# Patient Record
Sex: FEMALE | Race: WHITE | Employment: FULL TIME | URBAN - METROPOLITAN AREA
[De-identification: names, ages, dates, MRNs, and addresses within clinical notes are randomized per-mention and may not be internally consistent; named-entity substitution may affect disease eponyms.]

---

## 2016-10-25 LAB
C TRACH DNA SPEC QL PROBE+SIG AMP: NORMAL
HBV SURFACE AG SERPL QL IA: NORMAL
HIV 1+2 AB+HIV1 P24 AG SERPL QL IA: NORMAL
RUBELLA ABY IGG: NORMAL

## 2017-03-10 LAB — T PALLIDUM IGG SER QL: NONREACTIVE

## 2017-04-12 ENCOUNTER — HOSPITAL ENCOUNTER (OUTPATIENT)
Facility: CLINIC | Age: 32
End: 2017-04-12
Payer: COMMERCIAL

## 2017-05-11 LAB — GROUP B STREP PCR: POSITIVE

## 2017-05-12 ENCOUNTER — HOSPITAL ENCOUNTER (OUTPATIENT)
Facility: CLINIC | Age: 32
Discharge: HOME | End: 2017-05-12
Attending: OBSTETRICS & GYNECOLOGY | Admitting: OBSTETRICS & GYNECOLOGY
Payer: COMMERCIAL

## 2017-05-12 VITALS
WEIGHT: 267 LBS | BODY MASS INDEX: 44.48 KG/M2 | TEMPERATURE: 98.4 F | SYSTOLIC BLOOD PRESSURE: 143 MMHG | RESPIRATION RATE: 18 BRPM | DIASTOLIC BLOOD PRESSURE: 82 MMHG | HEIGHT: 65 IN

## 2017-05-12 LAB
ALT SERPL W P-5'-P-CCNC: 14 U/L (ref 0–50)
AST SERPL W P-5'-P-CCNC: 15 U/L (ref 0–45)
CREAT SERPL-MCNC: 0.46 MG/DL (ref 0.52–1.04)
CREAT UR-MCNC: 26 MG/DL
ERYTHROCYTE [DISTWIDTH] IN BLOOD BY AUTOMATED COUNT: 13.3 % (ref 10–15)
GFR SERPL CREATININE-BSD FRML MDRD: ABNORMAL ML/MIN/1.7M2
HCT VFR BLD AUTO: 32.9 % (ref 35–47)
HGB BLD-MCNC: 11.1 G/DL (ref 11.7–15.7)
MCH RBC QN AUTO: 33.2 PG (ref 26.5–33)
MCHC RBC AUTO-ENTMCNC: 33.7 G/DL (ref 31.5–36.5)
MCV RBC AUTO: 99 FL (ref 78–100)
PLATELET # BLD AUTO: 207 10E9/L (ref 150–450)
PROT UR-MCNC: 0.05 G/L
PROT/CREAT 24H UR: 0.19 G/G CR (ref 0–0.2)
RBC # BLD AUTO: 3.34 10E12/L (ref 3.8–5.2)
URATE SERPL-MCNC: 3.7 MG/DL (ref 2.6–6)
WBC # BLD AUTO: 5.3 10E9/L (ref 4–11)

## 2017-05-12 PROCEDURE — 36415 COLL VENOUS BLD VENIPUNCTURE: CPT | Performed by: OBSTETRICS & GYNECOLOGY

## 2017-05-12 PROCEDURE — 82565 ASSAY OF CREATININE: CPT | Performed by: OBSTETRICS & GYNECOLOGY

## 2017-05-12 PROCEDURE — 84156 ASSAY OF PROTEIN URINE: CPT | Performed by: OBSTETRICS & GYNECOLOGY

## 2017-05-12 PROCEDURE — 84460 ALANINE AMINO (ALT) (SGPT): CPT | Performed by: OBSTETRICS & GYNECOLOGY

## 2017-05-12 PROCEDURE — 84550 ASSAY OF BLOOD/URIC ACID: CPT | Performed by: OBSTETRICS & GYNECOLOGY

## 2017-05-12 PROCEDURE — 99214 OFFICE O/P EST MOD 30 MIN: CPT | Mod: 25

## 2017-05-12 PROCEDURE — 84450 TRANSFERASE (AST) (SGOT): CPT | Performed by: OBSTETRICS & GYNECOLOGY

## 2017-05-12 PROCEDURE — 59025 FETAL NON-STRESS TEST: CPT | Mod: 59

## 2017-05-12 PROCEDURE — 85027 COMPLETE CBC AUTOMATED: CPT | Performed by: OBSTETRICS & GYNECOLOGY

## 2017-05-12 PROCEDURE — 59025 FETAL NON-STRESS TEST: CPT

## 2017-05-12 RX ORDER — ONDANSETRON 2 MG/ML
4 INJECTION INTRAMUSCULAR; INTRAVENOUS EVERY 6 HOURS PRN
Status: DISCONTINUED | OUTPATIENT
Start: 2017-05-12 | End: 2017-05-12 | Stop reason: HOSPADM

## 2017-05-12 NOTE — IP AVS SNAPSHOT
MRN:[id number]                      After Visit Summary   5/12/2017    Linda Ricketts    MRN: [id number]           Thank you!     Thank you for choosing [facility] for your care. Our goal is always to provide you with excellent care. Hearing back from our patients is one way we can continue to improve our services. Please take a few minutes to complete the written survey that you may receive in the mail after you visit. If you would like to speak to someone directly about your visit please contact Patient Relations at [phone]. Thank you!          Patient Information     Date Of Birth          1985        About your hospital stay     You were admitted on:  May 12, 2017 You last received care in the:  [facility] Labor and Delivery    You were discharged on:  May 12, 2017       Who to Call     For medical emergencies, please call 911.  For non-urgent questions about your medical care, please call your primary care provider or clinic, [phone]          Attending Provider     Provider Specialty    Gia Hughes MD OB/Gyn       Primary Care Provider Office Phone # Fax #    Lakia Fischer, [facility] [phone]       [facility] [address] [address]        Further instructions from your care team       Discharge Instruction for Undelivered Patients      You were seen for: Pregnancy induced hypertention.  We Consulted: Dr. Hughes  You had (Test or Medicine):NST, UA and PIH labs.     Diet:   Drink 8 to 12 glasses of liquids (milk, juice, water) every day.  You may eat meals and snacks.     Activity:  Call your doctor or nurse midwife if your baby is moving less than usual.   May continue normal activities, no field trips.    Call your provider if you notice:  Swelling in your face or increased swelling in your hands or legs.  Headaches that are not relieved by Tylenol (acetaminophen).  Changes in your vision (blurring: seeing spots or  "stars.)  Nausea (sick to your stomach) and vomiting (throwing up).   Weight gain of 5 pounds or more per week.  Heartburn that doesn't go away.  Signs of bladder infection: pain when you urinate (use the toilet), need to go more often and more urgently.  The bag of stephens (rupture of membranes) breaks, or you notice leaking in your underwear.  Bright red blood in your underwear.  Abdominal (lower belly) or stomach pain.  For first baby: Contractions (tightening) less than 5 minutes apart for one hour or more.  Second (plus) baby: Contractions (tightening) less than 10 minutes apart and getting stronger.  *If less than 34 weeks: Contractions (tightenings) more than 6 times in one hour.  Increase or change in vaginal discharge (note the color and amount)  Other: Call with any questions or concerns.    Follow-up:  As scheduled in the clinic (appointment scheduled for Monday).          Pending Results     No orders found from 5/10/2017 to 2017.            Admission Information     Date & Time Provider Department Dept. Phone    2017 Gia Hughes MD [facility] Labor and Delivery [phone]      Your Vitals Were     Blood Pressure Temperature Height Weight BMI (Body Mass Index)       143/82 98.4  F (36.9  C) (Oral) 1.651 m (5' 5\") 121.1 kg (267 lb) 44.43 kg/m2       MyChart Information     [employer] lets you send messages to your doctor, view your test results, renew your prescriptions, schedule appointments and more. To sign up, go to [email or web address] . Click on \"Log in\" on the left side of the screen, which will take you to the Welcome page. Then click on \"Sign up Now\" on the right side of the page.     You will be asked to enter the access code listed below, as well as some personal information. Please follow the directions to create your username and password.     Your access code is: [email or web address]  Expires: 8/10/2017 12:54 PM     Your access code will  in 90 days. If you need help or a " new code, please call your [place] clinic or [phone].        Care EveryWhere ID     This is your Care EveryWhere ID. This could be used by other organizations to access your [place] medical records  [id number]           Review of your medicines      UNREVIEWED medicines. Ask your doctor about these medicines        Dose / Directions    PRENATAL VITAMIN PO        Refills:  0       TYLENOL PO        Refills:  0       ZYRTEC ALLERGY PO        Refills:  0                Protect others around you: Learn how to safely use, store and throw away your medicines at www.disposemymeds.org.             Medication List: This is a list of all your medications and when to take them. Check marks below indicate your daily home schedule. Keep this list as a reference.      Medications           Morning Afternoon Evening Bedtime As Needed    PRENATAL VITAMIN PO                                TYLENOL PO                                ZYRTEC ALLERGY PO

## 2017-05-12 NOTE — PLAN OF CARE
, 36 4/7 weeks gestation. Seen at office today. Sent to labor and delivery for PIH evaluation. Monitors explained and applied. History and prenatal reviewed. +2 reflexes, no clonus. +3 edema of feet noted, denies headache or epigastric pain. UA to lab and blood specimen drawn.

## 2017-05-12 NOTE — PROVIDER NOTIFICATION
05/12/17 1247   Provider Notification   Provider Name/Title Dr. Hughes   Method of Notification Phone   Request Evaluate - Remote   Notification Reason Patient Arrived;Lab/Diagnostic Study   Reactive fetal tracing, labs, UA and serial BP readings reported. Order received for discharge. Patient has appointment on Monday at doctor's office. Patient to continue normal activities, no field trips.

## 2017-05-12 NOTE — DISCHARGE INSTRUCTIONS
Discharge Instruction for Undelivered Patients      You were seen for: Pregnancy induced hypertention.  We Consulted: Dr. Hughes  You had (Test or Medicine):NST, UA and PIH labs.     Diet:   Drink 8 to 12 glasses of liquids (milk, juice, water) every day.  You may eat meals and snacks.     Activity:  Call your doctor or nurse midwife if your baby is moving less than usual.   May continue normal activities, no field trips.    Call your provider if you notice:  Swelling in your face or increased swelling in your hands or legs.  Headaches that are not relieved by Tylenol (acetaminophen).  Changes in your vision (blurring: seeing spots or stars.)  Nausea (sick to your stomach) and vomiting (throwing up).   Weight gain of 5 pounds or more per week.  Heartburn that doesn't go away.  Signs of bladder infection: pain when you urinate (use the toilet), need to go more often and more urgently.  The bag of stephens (rupture of membranes) breaks, or you notice leaking in your underwear.  Bright red blood in your underwear.  Abdominal (lower belly) or stomach pain.  For first baby: Contractions (tightening) less than 5 minutes apart for one hour or more.  Second (plus) baby: Contractions (tightening) less than 10 minutes apart and getting stronger.  *If less than 34 weeks: Contractions (tightenings) more than 6 times in one hour.  Increase or change in vaginal discharge (note the color and amount)  Other: Call with any questions or concerns.    Follow-up:  As scheduled in the clinic (appointment scheduled for Monday).

## 2017-05-12 NOTE — IP AVS SNAPSHOT
[facility] Labor and Delivery    [address]    [facility] [address]    Phone:  [phone]    Fax:  [phone]                                       After Visit Summary   5/12/2017    Linda Ricketts    MRN: [id number]           After Visit Summary Signature Page     I have received my discharge instructions, and my questions have been answered. I have discussed any challenges I see with this plan with the nurse or doctor.    ..........................................................................................................................................  Patient/Patient Representative Signature      ..........................................................................................................................................  Patient Representative Print Name and Relationship to Patient    ..................................................               ................................................  Date                                            Time    ..........................................................................................................................................  Reviewed by Signature/Title    ...................................................              ..............................................  Date                                                            Time

## 2017-05-19 ENCOUNTER — ANESTHESIA EVENT (OUTPATIENT)
Dept: OBGYN | Facility: CLINIC | Age: 32
End: 2017-05-19
Payer: COMMERCIAL

## 2017-05-19 ENCOUNTER — ANESTHESIA (OUTPATIENT)
Dept: OBGYN | Facility: CLINIC | Age: 32
End: 2017-05-19
Payer: COMMERCIAL

## 2017-05-19 ENCOUNTER — HOSPITAL ENCOUNTER (INPATIENT)
Facility: CLINIC | Age: 32
LOS: 5 days | Discharge: HOME HEALTH | End: 2017-05-24
Attending: REGISTERED NURSE | Admitting: REGISTERED NURSE
Payer: COMMERCIAL

## 2017-05-19 DIAGNOSIS — Z98.891 S/P PRIMARY LOW TRANSVERSE C-SECTION: Primary | ICD-10-CM

## 2017-05-19 LAB
ABO + RH BLD: ABNORMAL
ABO + RH BLD: ABNORMAL
ALT SERPL W P-5'-P-CCNC: 11 U/L (ref 0–50)
ANION GAP SERPL CALCULATED.3IONS-SCNC: 10 MMOL/L (ref 3–14)
AST SERPL W P-5'-P-CCNC: 11 U/L (ref 0–45)
BLD GP AB INVEST PLASRBC-IMP: ABNORMAL
BLD GP AB SCN SERPL QL: ABNORMAL
BLOOD BANK CMNT PATIENT-IMP: ABNORMAL
BUN SERPL-MCNC: 7 MG/DL (ref 7–30)
CALCIUM SERPL-MCNC: 9.1 MG/DL (ref 8.5–10.1)
CHLORIDE SERPL-SCNC: 109 MMOL/L (ref 94–109)
CO2 SERPL-SCNC: 20 MMOL/L (ref 20–32)
CREAT SERPL-MCNC: 0.44 MG/DL (ref 0.52–1.04)
CREAT UR-MCNC: 125 MG/DL
ERYTHROCYTE [DISTWIDTH] IN BLOOD BY AUTOMATED COUNT: 13.5 % (ref 10–15)
GFR SERPL CREATININE-BSD FRML MDRD: ABNORMAL ML/MIN/1.7M2
GLUCOSE SERPL-MCNC: 90 MG/DL (ref 70–99)
HCT VFR BLD AUTO: 33.3 % (ref 35–47)
HGB BLD-MCNC: 11.5 G/DL (ref 11.7–15.7)
MCH RBC QN AUTO: 33.2 PG (ref 26.5–33)
MCHC RBC AUTO-ENTMCNC: 34.5 G/DL (ref 31.5–36.5)
MCV RBC AUTO: 96 FL (ref 78–100)
PLATELET # BLD AUTO: 232 10E9/L (ref 150–450)
POTASSIUM SERPL-SCNC: 4.1 MMOL/L (ref 3.4–5.3)
PROT UR-MCNC: 0.21 G/L
PROT/CREAT 24H UR: 0.17 G/G CR (ref 0–0.2)
RBC # BLD AUTO: 3.46 10E12/L (ref 3.8–5.2)
SODIUM SERPL-SCNC: 139 MMOL/L (ref 133–144)
SPECIMEN EXP DATE BLD: ABNORMAL
URATE SERPL-MCNC: 3.5 MG/DL (ref 2.6–6)
WBC # BLD AUTO: 7.2 10E9/L (ref 4–11)

## 2017-05-19 PROCEDURE — 36415 COLL VENOUS BLD VENIPUNCTURE: CPT | Performed by: REGISTERED NURSE

## 2017-05-19 PROCEDURE — 86900 BLOOD TYPING SEROLOGIC ABO: CPT | Performed by: REGISTERED NURSE

## 2017-05-19 PROCEDURE — 84156 ASSAY OF PROTEIN URINE: CPT | Performed by: REGISTERED NURSE

## 2017-05-19 PROCEDURE — 37000011 ZZH ANESTHESIA WARD SERVICE

## 2017-05-19 PROCEDURE — 84550 ASSAY OF BLOOD/URIC ACID: CPT | Performed by: REGISTERED NURSE

## 2017-05-19 PROCEDURE — 84460 ALANINE AMINO (ALT) (SGPT): CPT | Performed by: REGISTERED NURSE

## 2017-05-19 PROCEDURE — 86901 BLOOD TYPING SEROLOGIC RH(D): CPT | Performed by: REGISTERED NURSE

## 2017-05-19 PROCEDURE — 40000671 ZZH STATISTIC ANESTHESIA CASE

## 2017-05-19 PROCEDURE — 25000125 ZZHC RX 250: Performed by: REGISTERED NURSE

## 2017-05-19 PROCEDURE — S0077 INJECTION, CLINDAMYCIN PHOSP: HCPCS | Performed by: REGISTERED NURSE

## 2017-05-19 PROCEDURE — 85027 COMPLETE CBC AUTOMATED: CPT | Performed by: REGISTERED NURSE

## 2017-05-19 PROCEDURE — 37000011 ZZH ANESTHESIA WARD SERVICE: Performed by: NURSE ANESTHETIST, CERTIFIED REGISTERED

## 2017-05-19 PROCEDURE — 86870 RBC ANTIBODY IDENTIFICATION: CPT | Performed by: REGISTERED NURSE

## 2017-05-19 PROCEDURE — 86780 TREPONEMA PALLIDUM: CPT | Performed by: REGISTERED NURSE

## 2017-05-19 PROCEDURE — 84450 TRANSFERASE (AST) (SGOT): CPT | Performed by: REGISTERED NURSE

## 2017-05-19 PROCEDURE — 80048 BASIC METABOLIC PNL TOTAL CA: CPT | Performed by: REGISTERED NURSE

## 2017-05-19 PROCEDURE — 86850 RBC ANTIBODY SCREEN: CPT | Performed by: REGISTERED NURSE

## 2017-05-19 PROCEDURE — 12000029 ZZH R&B OB INTERMEDIATE

## 2017-05-19 PROCEDURE — 25000132 ZZH RX MED GY IP 250 OP 250 PS 637: Performed by: REGISTERED NURSE

## 2017-05-19 RX ORDER — CLINDAMYCIN PHOSPHATE 900 MG/50ML
900 INJECTION, SOLUTION INTRAVENOUS EVERY 8 HOURS
Status: DISCONTINUED | OUTPATIENT
Start: 2017-05-19 | End: 2017-05-21

## 2017-05-19 RX ORDER — OXYCODONE AND ACETAMINOPHEN 5; 325 MG/1; MG/1
1 TABLET ORAL
Status: DISCONTINUED | OUTPATIENT
Start: 2017-05-19 | End: 2017-05-21

## 2017-05-19 RX ORDER — OXYTOCIN 10 [USP'U]/ML
10 INJECTION, SOLUTION INTRAMUSCULAR; INTRAVENOUS
Status: DISCONTINUED | OUTPATIENT
Start: 2017-05-19 | End: 2017-05-21

## 2017-05-19 RX ORDER — METHYLERGONOVINE MALEATE 0.2 MG/ML
200 INJECTION INTRAVENOUS
Status: DISCONTINUED | OUTPATIENT
Start: 2017-05-19 | End: 2017-05-21

## 2017-05-19 RX ORDER — NALOXONE HYDROCHLORIDE 0.4 MG/ML
.1-.4 INJECTION, SOLUTION INTRAMUSCULAR; INTRAVENOUS; SUBCUTANEOUS
Status: DISCONTINUED | OUTPATIENT
Start: 2017-05-19 | End: 2017-05-21

## 2017-05-19 RX ORDER — OXYTOCIN/0.9 % SODIUM CHLORIDE 30/500 ML
100-340 PLASTIC BAG, INJECTION (ML) INTRAVENOUS CONTINUOUS PRN
Status: COMPLETED | OUTPATIENT
Start: 2017-05-19 | End: 2017-05-21

## 2017-05-19 RX ORDER — IBUPROFEN 800 MG/1
800 TABLET, FILM COATED ORAL
Status: DISCONTINUED | OUTPATIENT
Start: 2017-05-19 | End: 2017-05-21

## 2017-05-19 RX ORDER — ONDANSETRON 2 MG/ML
4 INJECTION INTRAMUSCULAR; INTRAVENOUS EVERY 6 HOURS PRN
Status: DISCONTINUED | OUTPATIENT
Start: 2017-05-19 | End: 2017-05-21

## 2017-05-19 RX ORDER — ACETAMINOPHEN 325 MG/1
650 TABLET ORAL EVERY 4 HOURS PRN
Status: DISCONTINUED | OUTPATIENT
Start: 2017-05-19 | End: 2017-05-21

## 2017-05-19 RX ORDER — HYDROXYZINE HYDROCHLORIDE 50 MG/1
100 TABLET, FILM COATED ORAL EVERY 6 HOURS PRN
Status: DISCONTINUED | OUTPATIENT
Start: 2017-05-19 | End: 2017-05-24 | Stop reason: HOSPADM

## 2017-05-19 RX ORDER — LIDOCAINE 40 MG/G
CREAM TOPICAL
Status: DISCONTINUED | OUTPATIENT
Start: 2017-05-19 | End: 2017-05-21

## 2017-05-19 RX ORDER — SODIUM CHLORIDE, SODIUM LACTATE, POTASSIUM CHLORIDE, CALCIUM CHLORIDE 600; 310; 30; 20 MG/100ML; MG/100ML; MG/100ML; MG/100ML
INJECTION, SOLUTION INTRAVENOUS CONTINUOUS
Status: DISCONTINUED | OUTPATIENT
Start: 2017-05-19 | End: 2017-05-21

## 2017-05-19 RX ORDER — CARBOPROST TROMETHAMINE 250 UG/ML
250 INJECTION, SOLUTION INTRAMUSCULAR
Status: DISCONTINUED | OUTPATIENT
Start: 2017-05-19 | End: 2017-05-21

## 2017-05-19 RX ADMIN — DINOPROSTONE 10 MG: 10 INSERT VAGINAL at 21:34

## 2017-05-19 RX ADMIN — CLINDAMYCIN PHOSPHATE 900 MG: 18 INJECTION, SOLUTION INTRAVENOUS at 22:40

## 2017-05-19 NOTE — IP AVS SNAPSHOT
MRN:[id number]                      After Visit Summary   2017    Linda Ricketts    MRN: [id number]           Thank you!     Thank you for choosing [facility] for your care. Our goal is always to provide you with excellent care. Hearing back from our patients is one way we can continue to improve our services. Please take a few minutes to complete the written survey that you may receive in the mail after you visit with us. Thank you!        Patient Information     Date Of Birth          1985        About your hospital stay     You were admitted on:  May 19, 2017 You last received care in the:  [address]    You were discharged on:  May 24, 2017       Who to Call     For medical emergencies, please call 911.  For non-urgent questions about your medical care, please call your primary care provider or clinic, [phone]  For questions related to your surgery, please call your surgery clinic        Attending Provider     Provider Specialty    Temi Yu APRN CNM [facility]    Johnna Hansen MD OB/Gyn       Primary Care Provider Office Phone # Fax #    Lakia Belkis Fischer [phone]       [facility] [address] [address]        After Care Instructions     Activity       Review discharge instructions            Diet       Resume previous diet            Discharge Instructions - Postpartum visit       Schedule postpartum visit with your provider and return to clinic in 6 weeks.                  Further instructions from your care team       Postop  Birth Instructions    Activity       Do not lift more than 10 pounds for 6 weeks after surgery.  Ask family and friends for help when you need it.    No driving until you have stopped taking your pain medications (usually two weeks after surgery).    No heavy exercise or activity for 6 weeks.  Don't do anything that will put a strain on your surgery site.    Don't  strain when using the toilet.  Your care team may prescribe a stool softener if you have problems with your bowel movements.     To care for your incision:       Keep the incision clean and dry.    Do not soak your incision in water. No swimming or hot tubs until it has fully healed. You may soak in the bathtub if the water level is below your incision.    Do not use peroxide, gel, cream, lotion, or ointment on your incision.    Adjust your clothes to avoid pressure on your surgery site (check the elastic in your underwear for example).     You may see a small amount of clear or pink drainage and this is normal.  Check with your health care provider:       If the drainage increases or has an odor.    If the incision reddens, you have swelling, or develop a rash.    If you have increased pain and the medicine we prescribed doesn't help.    If you have a fever above 100.4 F (38 C) with or without chills when placing thermometer under your tongue.   The area around your incision (surgery wound), will feel numb.  This is normal. The numbness should go away in less than a year.     Keep your hands clean:  Always wash your hands before touching your incision (surgery wound). This helps reduce your risk of infection. If your hands aren't dirty, you may use an alcohol hand-rub to clean your hands. Keep your nails clean and short.    Call your healthcare provider if you have any of these symptoms:       You soak a sanitary pad with blood within 1 hour, or you see blood clots larger than a golf ball.    Bleeding that lasts more than 6 weeks.    Vaginal discharge that smells bad.    Severe pain, cramping or tenderness in your lower belly area.    A need to urinate more frequently (use the toilet more often), more urgently (use the toilet very quickly), or it burns when you urinate.    Nausea and vomiting.    Redness, swelling or pain around a vein in your leg.    Problems breastfeeding or a red or painful area on your  "breast.    Chest pain and cough or are gasping for air.    Problems with coping with sadness, anxiety or depression. If you have concerns about hurting yourself or the baby, call your provider immediately.      You have questions or concerns after you return home.                  Pending Results     No orders found from 2017 to 2017.            Statement of Approval     Ordered          17 0823  I have reviewed and agree with all the recommendations and orders detailed in this document.  EFFECTIVE NOW     Approved and electronically signed by:  Temi Yu APRN CNM             Admission Information     Date & Time Provider Department Dept. Phone    2017 Johnna Hansen MD [address] [phone]      Your Vitals Were     Blood Pressure Pulse Temperature Respirations Height Weight    125/76 68 97.9  F (36.6  C) (Oral) 14 1.651 m (5' 5\") 122 kg (269 lb)    Pulse Oximetry BMI (Body Mass Index)                99% 44.76 kg/m2          [facility] Information     [employer] lets you send messages to your doctor, view your test results, renew your prescriptions, schedule appointments and more. To sign up, go to [email or web address] . Click on \"Log in\" on the left side of the screen, which will take you to the Welcome page. Then click on \"Sign up Now\" on the right side of the page.     You will be asked to enter the access code listed below, as well as some personal information. Please follow the directions to create your username and password.     Your access code is: [email or web address]  Expires: 8/10/2017 12:54 PM     Your access code will  in 90 days. If you need help or a new code, please call your [place] clinic or [phone].        Care EveryWhere ID     This is your Care EveryWhere ID. This could be used by other organizations to access your [place] medical records  [id number]           Review of your medicines      UNREVIEWED medicines. Ask your " doctor about these medicines        Dose / Directions    TYLENOL PO        Refills:  0         START taking        Dose / Directions    ibuprofen 200 MG tablet   Commonly known as:  ADVIL/MOTRIN        Dose:  400-800 mg   Take 2-4 tablets (400-800 mg) by mouth every 6 hours as needed for other (cramping)   Refills:  0       oxyCODONE 5 MG IR tablet   Commonly known as:  ROXICODONE        Dose:  5-10 mg   Take 1-2 tablets (5-10 mg) by mouth every 3 hours as needed for moderate to severe pain   Quantity:  30 tablet   Refills:  0         CONTINUE these medicines which have NOT CHANGED        Dose / Directions    PRENATAL VITAMIN PO        Refills:  0         STOP taking     ZYRTEC ALLERGY PO                Where to get your medicines      Some of these will need a paper prescription and others can be bought over the counter. Ask your nurse if you have questions.     Bring a paper prescription for each of these medications     oxyCODONE 5 MG IR tablet       You don't need a prescription for these medications     ibuprofen 200 MG tablet                Protect others around you: Learn how to safely use, store and throw away your medicines at www.disposemymeds.org.             Medication List: This is a list of all your medications and when to take them. Check marks below indicate your daily home schedule. Keep this list as a reference.      Medications           Morning Afternoon Evening Bedtime As Needed    ibuprofen 200 MG tablet   Commonly known as:  ADVIL/MOTRIN   Take 2-4 tablets (400-800 mg) by mouth every 6 hours as needed for other (cramping)   Last time this was given:  800 mg on 5/24/2017  5:55 AM                                oxyCODONE 5 MG IR tablet   Commonly known as:  ROXICODONE   Take 1-2 tablets (5-10 mg) by mouth every 3 hours as needed for moderate to severe pain   Last time this was given:  5 mg on 5/24/2017  9:20 AM                                PRENATAL VITAMIN PO                                 TYLENOL PO   Last time this was given:  975 mg on 5/24/2017 12:03 AM

## 2017-05-19 NOTE — IP AVS SNAPSHOT
[address]    [place] [address]    Phone:  [phone]                                       After Visit Summary   5/19/2017    Linda Ricketts    MRN: [id number]           After Visit Summary Signature Page     I have received my discharge instructions, and my questions have been answered. I have discussed any challenges I see with this plan with the nurse or doctor.    ..........................................................................................................................................  Patient/Patient Representative Signature      ..........................................................................................................................................  Patient Representative Print Name and Relationship to Patient    ..................................................               ................................................  Date                                            Time    ..........................................................................................................................................  Reviewed by Signature/Title    ...................................................              ..............................................  Date                                                            Time

## 2017-05-20 ENCOUNTER — ANESTHESIA EVENT (OUTPATIENT)
Dept: OBGYN | Facility: CLINIC | Age: 32
End: 2017-05-20
Payer: COMMERCIAL

## 2017-05-20 ENCOUNTER — ANESTHESIA (OUTPATIENT)
Dept: OBGYN | Facility: CLINIC | Age: 32
End: 2017-05-20
Payer: COMMERCIAL

## 2017-05-20 LAB — T PALLIDUM IGG+IGM SER QL: NEGATIVE

## 2017-05-20 PROCEDURE — S0077 INJECTION, CLINDAMYCIN PHOSP: HCPCS | Performed by: REGISTERED NURSE

## 2017-05-20 PROCEDURE — 25000128 H RX IP 250 OP 636: Performed by: ANESTHESIOLOGY

## 2017-05-20 PROCEDURE — 37000011 ZZH ANESTHESIA WARD SERVICE

## 2017-05-20 PROCEDURE — 25000128 H RX IP 250 OP 636: Performed by: REGISTERED NURSE

## 2017-05-20 PROCEDURE — 25000125 ZZHC RX 250: Performed by: REGISTERED NURSE

## 2017-05-20 PROCEDURE — 3E0P7GC INTRODUCTION OF OTHER THERAPEUTIC SUBSTANCE INTO FEMALE REPRODUCTIVE, VIA NATURAL OR ARTIFICIAL OPENING: ICD-10-PCS | Performed by: REGISTERED NURSE

## 2017-05-20 PROCEDURE — 25000132 ZZH RX MED GY IP 250 OP 250 PS 637: Performed by: MIDWIFE

## 2017-05-20 PROCEDURE — 36415 COLL VENOUS BLD VENIPUNCTURE: CPT | Performed by: REGISTERED NURSE

## 2017-05-20 PROCEDURE — 25000132 ZZH RX MED GY IP 250 OP 250 PS 637: Performed by: REGISTERED NURSE

## 2017-05-20 PROCEDURE — S0191 MISOPROSTOL, ORAL, 200 MCG: HCPCS | Performed by: MIDWIFE

## 2017-05-20 PROCEDURE — 25000125 ZZHC RX 250: Performed by: ANESTHESIOLOGY

## 2017-05-20 PROCEDURE — 12000029 ZZH R&B OB INTERMEDIATE

## 2017-05-20 PROCEDURE — S0191 MISOPROSTOL, ORAL, 200 MCG: HCPCS | Performed by: REGISTERED NURSE

## 2017-05-20 PROCEDURE — 85049 AUTOMATED PLATELET COUNT: CPT | Performed by: REGISTERED NURSE

## 2017-05-20 RX ORDER — LIDOCAINE HYDROCHLORIDE AND EPINEPHRINE 15; 5 MG/ML; UG/ML
3 INJECTION, SOLUTION EPIDURAL
Status: DISCONTINUED | OUTPATIENT
Start: 2017-05-20 | End: 2017-05-21

## 2017-05-20 RX ORDER — MISOPROSTOL 100 UG/1
25 TABLET ORAL
Status: DISCONTINUED | OUTPATIENT
Start: 2017-05-20 | End: 2017-05-21

## 2017-05-20 RX ORDER — MISOPROSTOL 100 UG/1
25 TABLET ORAL EVERY 4 HOURS PRN
Status: DISCONTINUED | OUTPATIENT
Start: 2017-05-20 | End: 2017-05-20

## 2017-05-20 RX ORDER — NALOXONE HYDROCHLORIDE 0.4 MG/ML
.1-.4 INJECTION, SOLUTION INTRAMUSCULAR; INTRAVENOUS; SUBCUTANEOUS
Status: DISCONTINUED | OUTPATIENT
Start: 2017-05-20 | End: 2017-05-21

## 2017-05-20 RX ORDER — NALBUPHINE HYDROCHLORIDE 10 MG/ML
2.5-5 INJECTION, SOLUTION INTRAMUSCULAR; INTRAVENOUS; SUBCUTANEOUS EVERY 6 HOURS PRN
Status: DISCONTINUED | OUTPATIENT
Start: 2017-05-20 | End: 2017-05-21

## 2017-05-20 RX ORDER — EPHEDRINE SULFATE 50 MG/ML
5 INJECTION, SOLUTION INTRAMUSCULAR; INTRAVENOUS; SUBCUTANEOUS
Status: DISCONTINUED | OUTPATIENT
Start: 2017-05-20 | End: 2017-05-21

## 2017-05-20 RX ORDER — ONDANSETRON 4 MG/1
4 TABLET, ORALLY DISINTEGRATING ORAL EVERY 6 HOURS PRN
Status: DISCONTINUED | OUTPATIENT
Start: 2017-05-20 | End: 2017-05-21

## 2017-05-20 RX ORDER — FENTANYL CITRATE 50 UG/ML
100 INJECTION, SOLUTION INTRAMUSCULAR; INTRAVENOUS ONCE
Status: COMPLETED | OUTPATIENT
Start: 2017-05-20 | End: 2017-05-20

## 2017-05-20 RX ORDER — ROPIVACAINE HYDROCHLORIDE 2 MG/ML
10 INJECTION, SOLUTION EPIDURAL; INFILTRATION; PERINEURAL ONCE
Status: COMPLETED | OUTPATIENT
Start: 2017-05-20 | End: 2017-05-20

## 2017-05-20 RX ORDER — ONDANSETRON 2 MG/ML
4 INJECTION INTRAMUSCULAR; INTRAVENOUS EVERY 6 HOURS PRN
Status: DISCONTINUED | OUTPATIENT
Start: 2017-05-20 | End: 2017-05-21

## 2017-05-20 RX ADMIN — CLINDAMYCIN PHOSPHATE 900 MG: 18 INJECTION, SOLUTION INTRAVENOUS at 06:37

## 2017-05-20 RX ADMIN — Medication 25 MCG: at 11:59

## 2017-05-20 RX ADMIN — HYDROXYZINE HYDROCHLORIDE 100 MG: 50 TABLET, FILM COATED ORAL at 02:47

## 2017-05-20 RX ADMIN — Medication 25 MCG: at 18:26

## 2017-05-20 RX ADMIN — FENTANYL CITRATE 100 MCG: 50 INJECTION, SOLUTION INTRAMUSCULAR; INTRAVENOUS at 23:30

## 2017-05-20 RX ADMIN — Medication 12 ML/HR: at 23:40

## 2017-05-20 RX ADMIN — SODIUM CHLORIDE, POTASSIUM CHLORIDE, SODIUM LACTATE AND CALCIUM CHLORIDE 1000 ML: 600; 310; 30; 20 INJECTION, SOLUTION INTRAVENOUS at 22:20

## 2017-05-20 RX ADMIN — CLINDAMYCIN PHOSPHATE 900 MG: 18 INJECTION, SOLUTION INTRAVENOUS at 22:03

## 2017-05-20 RX ADMIN — Medication 25 MCG: at 02:45

## 2017-05-20 RX ADMIN — Medication 25 MCG: at 06:30

## 2017-05-20 RX ADMIN — ROPIVACAINE HYDROCHLORIDE 10 ML: 2 INJECTION, SOLUTION EPIDURAL; INFILTRATION at 23:30

## 2017-05-20 RX ADMIN — Medication 25 MCG: at 16:08

## 2017-05-20 RX ADMIN — CLINDAMYCIN PHOSPHATE 900 MG: 18 INJECTION, SOLUTION INTRAVENOUS at 14:26

## 2017-05-20 RX ADMIN — SODIUM CHLORIDE, POTASSIUM CHLORIDE, SODIUM LACTATE AND CALCIUM CHLORIDE: 600; 310; 30; 20 INJECTION, SOLUTION INTRAVENOUS at 18:24

## 2017-05-20 RX ADMIN — Medication 25 MCG: at 14:01

## 2017-05-20 NOTE — PLAN OF CARE
"Pt standing at bedside, feels back ache but discomfort hasn't gotten worse.  Told pt that she could order a light supper meal.  Pt stated that she feels \"bored and wants to eat.\"  Pt also feels frustrated with how long this process is taking.  Emotional support provided and reassured that this process can take more than 24 hrs.  "

## 2017-05-20 NOTE — PLAN OF CARE
Problem: Labor (Cervical Ripen, Induct, Augment) (Adult,Obstetrics,Pediatric)  Goal: Signs and Symptoms of Listed Potential Problems Will be Absent or Manageable (Labor)  Signs and symptoms of listed potential problems will be absent or manageable by discharge/transition of care (reference Labor (Cervical Ripen, Induct, Augment) (Adult,Obstetrics,Pediatric) CPG).   Outcome: No Change  Vitals within normal limits. BPs within normal limits. Denies having any headaches, epigastric pain, or vision changes. Mild edema to lower extremities, +2 pitting. Voiding without issues. Having has been intermittently uncomfortable with lower backache. Patient has been able to rest overnight. 2nd dose of Clindamycin infusing at this time. Report given to Orly KAPLAN RN.

## 2017-05-20 NOTE — PROGRESS NOTES
"OB Progress Note  2017  11:04 AM    S:  Feeling more back pain      O:  /70  Temp 98.4  F (36.9  C) (Temporal)  Resp 18  Ht 1.651 m (5' 5\")  Wt 122 kg (269 lb)  BMI 44.76 kg/m2  EFM: baseline 150, accelerations pos, neg decelerations, mod variability; Category I  Elcho:  Ctx q2 min  SVE:  1/50%/-3  Membranes: intact    A/P:  31 year old  @37w5d admitted with IOL for gestational hypertension  1.  Cytotec 25mcg po q 2 hours for further cervical ripening  2.  Consider sandoval bulb placement in 4 hours if no change  3.  Monitor closely for progress  4. Eval prn    Lakia Fischer CNM    "

## 2017-05-20 NOTE — PLAN OF CARE
" Pt cont to feel back ache with contractions, but otherwise feels \"the same\".  UC irreg. Palp mild.  Pt changes positioning frequently from high fowlers to standing.  Pt still able to relax in bed and work on her laptop. SVE done by CNM.  Cervix reportedly unchanged from last exam, states its anterior.  Discussed options with pt and .  Plan to give 2 more doses oral cytotec Q 2 hrs and CNM will be back to do SVE by 1900. Discussed may need to try Cook sandoval balloon.  Pt wishes to proceed.   "

## 2017-05-20 NOTE — H&P
[facility] Labor and Delivery History and Physical    Linda Ricketts MRN# [id number]   Age: 31 year old YOB: 1985     Date of Admission:  2017    Primary care provider: Lakia Fischer           Chief Complaint:   Linda Ricketts is a 31 year old  who is 37w5d pregnant and being admitted for induction of labor, indication gestational HTN. Pt was admitted at 1930 on 7:30 for Cervidil. Cervix was 1/50/-3 and posterior. GBS positive. Will use Clindmycin due to penicillin and cephalosporin allergies.     Pt began to have elevated pressures at 36 weeks. PIH labs and BPPs have been normal. Pressures have ranged from the 130s-150s/70s-80s. Pt also has polyhydramnios. MICKEY on 2017 was 31.9. Vertex was confirmed with US in the office today. Pt had elevated 1 hr GTT but passed 3hr GTT. Early 1 hr GTT was normal.     Cervidil came out accidentally at 0230. Pt was having vaginal irritation, so we switched to vaginal cytotec. Pt is feeling occasional ctxs in her back. She is mostly sleeping.           Pregnancy history:     OBSTETRIC HISTORY:  x 2     Obstetric History       T2      TAB0   SAB0   E0   M0   L2       # Outcome Date GA Lbr Kendell/2nd Weight Sex Delivery Anes PTL Lv   3 Current            2 Term            1 Term                   EDC: Estimated Date of Delivery: 2017   EFW: 7#11oz    Prenatal Labs:   Lab Results   Component Value Date    ABO A 2017    RH  Neg 2017    AS Pos (A) 2017    HEPBANG neg 10/25/2016    CHPCRT neg 10/25/2016    TREPAB nonreactive 03/10/2017    RUBELLAABIGG immune 10/25/2016    HGB 11.5 (L) 2017       GBS Status:   Lab Results   Component Value Date    GBS positive 2017       Active Problem List  Patient Active Problem List   Diagnosis     Labor and delivery indication for care or intervention     Indication for care in labor or delivery       Medication Prior to Admission  Prescriptions Prior to  "Admission   Medication Sig Dispense Refill Last Dose     Prenatal Vit-Fe Fumarate-FA (PRENATAL VITAMIN PO)    Past Week at Unknown time     Cetirizine HCl (ZYRTEC ALLERGY PO)    5/19/2017 at 0600     Acetaminophen (TYLENOL PO)    Unknown at Unknown time   .        Maternal Past Medical History:     Past Medical History:   Diagnosis Date     Hypertension     with pregnancy                          Social History:   I have reviewed this patient's social history          Physical Exam:   Vitals were reviewed  Blood pressure 114/52, temperature 98.4  F (36.9  C), temperature source Temporal, resp. rate 16, height 1.651 m (5' 5\"), weight 122 kg (269 lb).  Constitutional:   sleeping, no apparent distress, and appears stated age      Cervix: Deferred     Fetal Heart Rate Tracing: Category 1  Tocometer: irregular                       Assessment:   Linda Ricketts is a 37w5d pregnant female admitted with induction of labor, indication GHTN.  Polyhydramnios          Plan:   Admit - see IP orders  cervical ripening with misoprostol    CELI Zendejas CNM  "

## 2017-05-20 NOTE — PLAN OF CARE
Pt cont to have back ache, same as this am.  Pt able to move around in room without difficulty.  Did try ambulating in rangel with portable monitor.  Pt able to watch TV and work on computer without difficulty also.  CNM here and SVE done.  CNM reported no change in cervix since last exam this am.  Discussed POC with pt and  regarding changing to oral cytotec.   Pt wishes to proceed.  Pt allowed to order a light lunch.

## 2017-05-20 NOTE — PROGRESS NOTES
"OB Progress Note  2017  2:39 PM    S:  Continues to feel contractions in low back      O:  /70  Temp 98.6  F (37  C) (Temporal)  Resp 16  Ht 1.651 m (5' 5\")  Wt 122 kg (269 lb)  BMI 44.76 kg/m2  EFM: baseline 145, accelerations pos, neg decelerations, mod variability; Category I  [place]:  Ctx q2-3 min  SVE:  1/50%/-3  Membranes: intact    A/P:  31 year old  @37w5d. IOL gestational hypertension  1.  Cervix remains thick and unchanged. Cytotec 25mcg q2 hours x2  2.  Reassess @ 1900/prn  3.  Eval prn    Lakia Fischer CNM    "

## 2017-05-20 NOTE — PROGRESS NOTES
"OB Progress Note  2017  6:44 AM    S:  Pt is not feeling any ctxs.   No other complaints.      O:  /57  Temp 98.1  F (36.7  C) (Temporal)  Resp 16  Ht 1.651 m (5' 5\")  Wt 122 kg (269 lb)  BMI 44.76 kg/m2  EFM: Category I  [place]:  occasional  SVE:  Closed/30%/-4, very soft external os dilatated to a 1 but internal os is firmer and closed.   Membranes: intact.         A/P:  31 year old  @37w5d admitted with GHTN and polyhydramnios  1.  Routine cares  2.  Placed another dose of cytotec      Temi Yu    "

## 2017-05-20 NOTE — PROVIDER NOTIFICATION
"   05/20/17 0220   Provider Notification   Provider Name/Title Temi Yu CNM   Method of Notification Phone   Request Evaluate - Remote   Notification Reason SVE;Status Update;Membrane Status     CNM updated regarding unchanged SVE of 1/50/-3. Patient stated having more irritation and \"pressure\" to vaginal area. Patient very uncomfortable when SVE performed, Cervidil fell out during SVE. Orders received to start vaginal Cytotec.   "

## 2017-05-20 NOTE — ANESTHESIA CARE TRANSFER NOTE
Patient: Linda Ricketts    * No procedures listed *    Diagnosis: * No pre-op diagnosis entered *  Diagnosis Additional Information: No value filed.    Anesthesia Type:   No value filed.     Note:  Airway :Room Air  Patient transferred to:Medical/Surgical Unit  Comments: Diagnosis: Venous Insufficiency  Procedure: IV Start  Ordering Physician: aSul  Location: [facility]       Vitals: (Last set prior to Anesthesia Care Transfer)              Electronically Signed By: CELI Nunez CRNA  May 19, 2017  8:29 PM

## 2017-05-20 NOTE — PLAN OF CARE
Patient and  admitted to room 220 for induction of labor due to gestational hypertension. . Patient and  oriented to room, call light, plan of care including the use of cervical ripening agent, FHT monitoring, cervical examinations, frequent vitals, pain management, IV start, lab draws, antibiotic administration. Questions and concerns addressed. Patient in agreement with plan for the night.

## 2017-05-21 ENCOUNTER — ANESTHESIA (OUTPATIENT)
Dept: OBGYN | Facility: CLINIC | Age: 32
End: 2017-05-21
Payer: COMMERCIAL

## 2017-05-21 ENCOUNTER — ANESTHESIA EVENT (OUTPATIENT)
Dept: OBGYN | Facility: CLINIC | Age: 32
End: 2017-05-21
Payer: COMMERCIAL

## 2017-05-21 PROBLEM — Z98.891 S/P PRIMARY LOW TRANSVERSE C-SECTION: Status: ACTIVE | Noted: 2017-05-21

## 2017-05-21 LAB — PLATELET # BLD AUTO: 208 10E9/L (ref 150–450)

## 2017-05-21 PROCEDURE — 25000128 H RX IP 250 OP 636: Performed by: OBSTETRICS & GYNECOLOGY

## 2017-05-21 PROCEDURE — 12000037 ZZH R&B POSTPARTUM INTERMEDIATE

## 2017-05-21 PROCEDURE — 3E0R3CZ INTRODUCTION OF REGIONAL ANESTHETIC INTO SPINAL CANAL, PERCUTANEOUS APPROACH: ICD-10-PCS | Performed by: ANESTHESIOLOGY

## 2017-05-21 PROCEDURE — 37000008 ZZH ANESTHESIA TECHNICAL FEE, 1ST 30 MIN: Performed by: OBSTETRICS & GYNECOLOGY

## 2017-05-21 PROCEDURE — 25000128 H RX IP 250 OP 636: Performed by: NURSE ANESTHETIST, CERTIFIED REGISTERED

## 2017-05-21 PROCEDURE — 25000132 ZZH RX MED GY IP 250 OP 250 PS 637: Performed by: OBSTETRICS & GYNECOLOGY

## 2017-05-21 PROCEDURE — 37000009 ZZH ANESTHESIA TECHNICAL FEE, EACH ADDTL 15 MIN: Performed by: OBSTETRICS & GYNECOLOGY

## 2017-05-21 PROCEDURE — 25000125 ZZHC RX 250: Performed by: OBSTETRICS & GYNECOLOGY

## 2017-05-21 PROCEDURE — 71000013 ZZH RECOVERY PHASE 1 LEVEL 1 EA ADDTL HR: Performed by: OBSTETRICS & GYNECOLOGY

## 2017-05-21 PROCEDURE — 25000125 ZZHC RX 250: Performed by: NURSE ANESTHETIST, CERTIFIED REGISTERED

## 2017-05-21 PROCEDURE — 25000125 ZZHC RX 250: Performed by: REGISTERED NURSE

## 2017-05-21 PROCEDURE — 27210794 ZZH OR GENERAL SUPPLY STERILE: Performed by: OBSTETRICS & GYNECOLOGY

## 2017-05-21 PROCEDURE — 71000012 ZZH RECOVERY PHASE 1 LEVEL 1 FIRST HR: Performed by: OBSTETRICS & GYNECOLOGY

## 2017-05-21 PROCEDURE — 25000134 H RX MED IP 250 OP 636 PS 250: Performed by: NURSE ANESTHETIST, CERTIFIED REGISTERED

## 2017-05-21 PROCEDURE — 36000058 ZZH SURGERY LEVEL 3 EA 15 ADDTL MIN: Performed by: OBSTETRICS & GYNECOLOGY

## 2017-05-21 PROCEDURE — 25000128 H RX IP 250 OP 636: Performed by: REGISTERED NURSE

## 2017-05-21 PROCEDURE — S0077 INJECTION, CLINDAMYCIN PHOSP: HCPCS | Performed by: OBSTETRICS & GYNECOLOGY

## 2017-05-21 PROCEDURE — 36000056 ZZH SURGERY LEVEL 3 1ST 30 MIN: Performed by: OBSTETRICS & GYNECOLOGY

## 2017-05-21 PROCEDURE — 00HU33Z INSERTION OF INFUSION DEVICE INTO SPINAL CANAL, PERCUTANEOUS APPROACH: ICD-10-PCS | Performed by: ANESTHESIOLOGY

## 2017-05-21 RX ORDER — EPHEDRINE SULFATE 50 MG/ML
5 INJECTION, SOLUTION INTRAMUSCULAR; INTRAVENOUS; SUBCUTANEOUS
Status: DISCONTINUED | OUTPATIENT
Start: 2017-05-21 | End: 2017-05-23

## 2017-05-21 RX ORDER — KETOROLAC TROMETHAMINE 30 MG/ML
INJECTION, SOLUTION INTRAMUSCULAR; INTRAVENOUS
Status: DISCONTINUED
Start: 2017-05-21 | End: 2017-05-21 | Stop reason: HOSPADM

## 2017-05-21 RX ORDER — DIPHENHYDRAMINE HYDROCHLORIDE 50 MG/ML
25 INJECTION INTRAMUSCULAR; INTRAVENOUS EVERY 6 HOURS PRN
Status: DISCONTINUED | OUTPATIENT
Start: 2017-05-21 | End: 2017-05-24 | Stop reason: HOSPADM

## 2017-05-21 RX ORDER — MISOPROSTOL 200 UG/1
400 TABLET ORAL
Status: DISCONTINUED | OUTPATIENT
Start: 2017-05-21 | End: 2017-05-24 | Stop reason: HOSPADM

## 2017-05-21 RX ORDER — CITRIC ACID/SODIUM CITRATE 334-500MG
30 SOLUTION, ORAL ORAL
Status: COMPLETED | OUTPATIENT
Start: 2017-05-21 | End: 2017-05-21

## 2017-05-21 RX ORDER — OXYCODONE HYDROCHLORIDE 5 MG/1
5-10 TABLET ORAL
Status: DISCONTINUED | OUTPATIENT
Start: 2017-05-21 | End: 2017-05-24 | Stop reason: HOSPADM

## 2017-05-21 RX ORDER — OXYTOCIN/0.9 % SODIUM CHLORIDE 30/500 ML
PLASTIC BAG, INJECTION (ML) INTRAVENOUS
Status: DISCONTINUED
Start: 2017-05-21 | End: 2017-05-21 | Stop reason: HOSPADM

## 2017-05-21 RX ORDER — ONDANSETRON 2 MG/ML
INJECTION INTRAMUSCULAR; INTRAVENOUS
Status: DISCONTINUED
Start: 2017-05-21 | End: 2017-05-21 | Stop reason: HOSPADM

## 2017-05-21 RX ORDER — DIPHENHYDRAMINE HCL 25 MG
25 CAPSULE ORAL EVERY 6 HOURS PRN
Status: DISCONTINUED | OUTPATIENT
Start: 2017-05-21 | End: 2017-05-24 | Stop reason: HOSPADM

## 2017-05-21 RX ORDER — NALOXONE HYDROCHLORIDE 0.4 MG/ML
.1-.4 INJECTION, SOLUTION INTRAMUSCULAR; INTRAVENOUS; SUBCUTANEOUS
Status: DISCONTINUED | OUTPATIENT
Start: 2017-05-21 | End: 2017-05-24 | Stop reason: HOSPADM

## 2017-05-21 RX ORDER — MORPHINE SULFATE 0.5 MG/ML
INJECTION, SOLUTION EPIDURAL; INTRATHECAL; INTRAVENOUS PRN
Status: DISCONTINUED | OUTPATIENT
Start: 2017-05-21 | End: 2017-05-21

## 2017-05-21 RX ORDER — OXYTOCIN/0.9 % SODIUM CHLORIDE 30/500 ML
340 PLASTIC BAG, INJECTION (ML) INTRAVENOUS CONTINUOUS PRN
Status: DISCONTINUED | OUTPATIENT
Start: 2017-05-21 | End: 2017-05-24 | Stop reason: HOSPADM

## 2017-05-21 RX ORDER — CLINDAMYCIN PHOSPHATE 900 MG/50ML
900 INJECTION, SOLUTION INTRAVENOUS
Status: COMPLETED | OUTPATIENT
Start: 2017-05-21 | End: 2017-05-21

## 2017-05-21 RX ORDER — OXYTOCIN/0.9 % SODIUM CHLORIDE 30/500 ML
PLASTIC BAG, INJECTION (ML) INTRAVENOUS CONTINUOUS
Status: DISCONTINUED | OUTPATIENT
Start: 2017-05-21 | End: 2017-05-21

## 2017-05-21 RX ORDER — OXYTOCIN/0.9 % SODIUM CHLORIDE 30/500 ML
1-24 PLASTIC BAG, INJECTION (ML) INTRAVENOUS CONTINUOUS
Status: DISCONTINUED | OUTPATIENT
Start: 2017-05-21 | End: 2017-05-21

## 2017-05-21 RX ORDER — LIDOCAINE 40 MG/G
CREAM TOPICAL
Status: DISCONTINUED | OUTPATIENT
Start: 2017-05-21 | End: 2017-05-23

## 2017-05-21 RX ORDER — HYDROMORPHONE HYDROCHLORIDE 1 MG/ML
INJECTION, SOLUTION INTRAMUSCULAR; INTRAVENOUS; SUBCUTANEOUS
Status: DISCONTINUED
Start: 2017-05-21 | End: 2017-05-21 | Stop reason: HOSPADM

## 2017-05-21 RX ORDER — LIDOCAINE HCL/EPINEPHRINE/PF 2%-1:200K
VIAL (ML) INJECTION PRN
Status: DISCONTINUED | OUTPATIENT
Start: 2017-05-21 | End: 2017-05-21

## 2017-05-21 RX ORDER — SODIUM CHLORIDE, SODIUM LACTATE, POTASSIUM CHLORIDE, CALCIUM CHLORIDE 600; 310; 30; 20 MG/100ML; MG/100ML; MG/100ML; MG/100ML
INJECTION, SOLUTION INTRAVENOUS CONTINUOUS
Status: DISCONTINUED | OUTPATIENT
Start: 2017-05-21 | End: 2017-05-21 | Stop reason: HOSPADM

## 2017-05-21 RX ORDER — HYDROMORPHONE HYDROCHLORIDE 1 MG/ML
.3-.5 INJECTION, SOLUTION INTRAMUSCULAR; INTRAVENOUS; SUBCUTANEOUS EVERY 30 MIN PRN
Status: DISCONTINUED | OUTPATIENT
Start: 2017-05-21 | End: 2017-05-24 | Stop reason: HOSPADM

## 2017-05-21 RX ORDER — IBUPROFEN 400 MG/1
400-800 TABLET, FILM COATED ORAL EVERY 6 HOURS PRN
Status: DISCONTINUED | OUTPATIENT
Start: 2017-05-21 | End: 2017-05-24 | Stop reason: HOSPADM

## 2017-05-21 RX ORDER — OXYTOCIN 10 [USP'U]/ML
10 INJECTION, SOLUTION INTRAMUSCULAR; INTRAVENOUS
Status: DISCONTINUED | OUTPATIENT
Start: 2017-05-21 | End: 2017-05-24 | Stop reason: HOSPADM

## 2017-05-21 RX ORDER — SIMETHICONE 80 MG
80 TABLET,CHEWABLE ORAL 4 TIMES DAILY PRN
Status: DISCONTINUED | OUTPATIENT
Start: 2017-05-21 | End: 2017-05-24 | Stop reason: HOSPADM

## 2017-05-21 RX ORDER — MORPHINE SULFATE 1 MG/ML
INJECTION, SOLUTION EPIDURAL; INTRATHECAL; INTRAVENOUS
Status: DISCONTINUED
Start: 2017-05-21 | End: 2017-05-21 | Stop reason: HOSPADM

## 2017-05-21 RX ORDER — ONDANSETRON 2 MG/ML
INJECTION INTRAMUSCULAR; INTRAVENOUS PRN
Status: DISCONTINUED | OUTPATIENT
Start: 2017-05-21 | End: 2017-05-21

## 2017-05-21 RX ORDER — LIDOCAINE 40 MG/G
CREAM TOPICAL
Status: DISCONTINUED | OUTPATIENT
Start: 2017-05-21 | End: 2017-05-24 | Stop reason: HOSPADM

## 2017-05-21 RX ORDER — DEXTROSE, SODIUM CHLORIDE, SODIUM LACTATE, POTASSIUM CHLORIDE, AND CALCIUM CHLORIDE 5; .6; .31; .03; .02 G/100ML; G/100ML; G/100ML; G/100ML; G/100ML
INJECTION, SOLUTION INTRAVENOUS CONTINUOUS
Status: DISCONTINUED | OUTPATIENT
Start: 2017-05-21 | End: 2017-05-24 | Stop reason: HOSPADM

## 2017-05-21 RX ORDER — KETOROLAC TROMETHAMINE 30 MG/ML
30 INJECTION, SOLUTION INTRAMUSCULAR; INTRAVENOUS EVERY 6 HOURS
Status: COMPLETED | OUTPATIENT
Start: 2017-05-21 | End: 2017-05-22

## 2017-05-21 RX ORDER — HYDROCORTISONE 2.5 %
CREAM (GRAM) TOPICAL 3 TIMES DAILY PRN
Status: DISCONTINUED | OUTPATIENT
Start: 2017-05-21 | End: 2017-05-24 | Stop reason: HOSPADM

## 2017-05-21 RX ORDER — AMOXICILLIN 250 MG
1-2 CAPSULE ORAL 2 TIMES DAILY
Status: DISCONTINUED | OUTPATIENT
Start: 2017-05-21 | End: 2017-05-24 | Stop reason: HOSPADM

## 2017-05-21 RX ORDER — NALOXONE HYDROCHLORIDE 0.4 MG/ML
.1-.4 INJECTION, SOLUTION INTRAMUSCULAR; INTRAVENOUS; SUBCUTANEOUS
Status: DISCONTINUED | OUTPATIENT
Start: 2017-05-21 | End: 2017-05-23

## 2017-05-21 RX ORDER — LIDOCAINE 40 MG/G
CREAM TOPICAL
Status: DISCONTINUED | OUTPATIENT
Start: 2017-05-21 | End: 2017-05-21

## 2017-05-21 RX ORDER — ACETAMINOPHEN 325 MG/1
TABLET ORAL
Status: DISCONTINUED
Start: 2017-05-21 | End: 2017-05-21 | Stop reason: HOSPADM

## 2017-05-21 RX ORDER — OXYTOCIN/0.9 % SODIUM CHLORIDE 30/500 ML
100 PLASTIC BAG, INJECTION (ML) INTRAVENOUS CONTINUOUS
Status: DISCONTINUED | OUTPATIENT
Start: 2017-05-21 | End: 2017-05-24 | Stop reason: HOSPADM

## 2017-05-21 RX ORDER — BISACODYL 10 MG
10 SUPPOSITORY, RECTAL RECTAL DAILY PRN
Status: DISCONTINUED | OUTPATIENT
Start: 2017-05-23 | End: 2017-05-24 | Stop reason: HOSPADM

## 2017-05-21 RX ORDER — ONDANSETRON 2 MG/ML
4 INJECTION INTRAMUSCULAR; INTRAVENOUS EVERY 6 HOURS PRN
Status: DISCONTINUED | OUTPATIENT
Start: 2017-05-21 | End: 2017-05-24 | Stop reason: HOSPADM

## 2017-05-21 RX ORDER — ACETAMINOPHEN 325 MG/1
650 TABLET ORAL EVERY 4 HOURS PRN
Status: DISCONTINUED | OUTPATIENT
Start: 2017-05-24 | End: 2017-05-24 | Stop reason: HOSPADM

## 2017-05-21 RX ORDER — ACETAMINOPHEN 325 MG/1
975 TABLET ORAL EVERY 8 HOURS
Status: COMPLETED | OUTPATIENT
Start: 2017-05-21 | End: 2017-05-24

## 2017-05-21 RX ORDER — NALBUPHINE HYDROCHLORIDE 10 MG/ML
2.5-5 INJECTION, SOLUTION INTRAMUSCULAR; INTRAVENOUS; SUBCUTANEOUS EVERY 6 HOURS PRN
Status: DISCONTINUED | OUTPATIENT
Start: 2017-05-21 | End: 2017-05-23

## 2017-05-21 RX ORDER — LANOLIN 100 %
OINTMENT (GRAM) TOPICAL
Status: DISCONTINUED | OUTPATIENT
Start: 2017-05-21 | End: 2017-05-24 | Stop reason: HOSPADM

## 2017-05-21 RX ADMIN — ACETAMINOPHEN 975 MG: 325 TABLET, FILM COATED ORAL at 07:54

## 2017-05-21 RX ADMIN — KETOROLAC TROMETHAMINE 30 MG: 30 INJECTION, SOLUTION INTRAMUSCULAR at 19:58

## 2017-05-21 RX ADMIN — LIDOCAINE HYDROCHLORIDE,EPINEPHRINE BITARTRATE 5 ML: 20; .005 INJECTION, SOLUTION EPIDURAL; INFILTRATION; INTRACAUDAL; PERINEURAL at 06:08

## 2017-05-21 RX ADMIN — SODIUM CHLORIDE, POTASSIUM CHLORIDE, SODIUM LACTATE AND CALCIUM CHLORIDE: 600; 310; 30; 20 INJECTION, SOLUTION INTRAVENOUS at 06:45

## 2017-05-21 RX ADMIN — Medication 1 MILLI-UNITS/MIN: at 02:05

## 2017-05-21 RX ADMIN — SODIUM CITRATE AND CITRIC ACID MONOHYDRATE 30 ML: 500; 334 SOLUTION ORAL at 06:10

## 2017-05-21 RX ADMIN — LIDOCAINE HYDROCHLORIDE,EPINEPHRINE BITARTRATE 1 ML: 20; .005 INJECTION, SOLUTION EPIDURAL; INFILTRATION; INTRACAUDAL; PERINEURAL at 06:40

## 2017-05-21 RX ADMIN — ONDANSETRON 4 MG: 2 INJECTION INTRAMUSCULAR; INTRAVENOUS at 06:12

## 2017-05-21 RX ADMIN — Medication 100 ML/HR: at 10:42

## 2017-05-21 RX ADMIN — GENTAMICIN SULFATE 180 MG: 40 INJECTION, SOLUTION INTRAMUSCULAR; INTRAVENOUS at 06:29

## 2017-05-21 RX ADMIN — CLINDAMYCIN PHOSPHATE 900 MG: 18 INJECTION, SOLUTION INTRAVENOUS at 06:10

## 2017-05-21 RX ADMIN — SODIUM CHLORIDE, SODIUM LACTATE, POTASSIUM CHLORIDE, CALCIUM CHLORIDE AND DEXTROSE MONOHYDRATE: 5; 600; 310; 30; 20 INJECTION, SOLUTION INTRAVENOUS at 15:53

## 2017-05-21 RX ADMIN — SODIUM CHLORIDE, POTASSIUM CHLORIDE, SODIUM LACTATE AND CALCIUM CHLORIDE: 600; 310; 30; 20 INJECTION, SOLUTION INTRAVENOUS at 06:05

## 2017-05-21 RX ADMIN — KETOROLAC TROMETHAMINE 30 MG: 30 INJECTION, SOLUTION INTRAMUSCULAR at 07:55

## 2017-05-21 RX ADMIN — KETOROLAC TROMETHAMINE 30 MG: 30 INJECTION, SOLUTION INTRAMUSCULAR at 13:40

## 2017-05-21 RX ADMIN — LIDOCAINE HYDROCHLORIDE,EPINEPHRINE BITARTRATE 5 ML: 20; .005 INJECTION, SOLUTION EPIDURAL; INFILTRATION; INTRACAUDAL; PERINEURAL at 06:06

## 2017-05-21 RX ADMIN — SENNOSIDES AND DOCUSATE SODIUM 1 TABLET: 8.6; 5 TABLET ORAL at 19:58

## 2017-05-21 RX ADMIN — Medication 340 ML/HR: at 06:27

## 2017-05-21 RX ADMIN — ACETAMINOPHEN 975 MG: 325 TABLET, FILM COATED ORAL at 15:45

## 2017-05-21 RX ADMIN — MORPHINE SULFATE 1 MG: 0.5 INJECTION, SOLUTION EPIDURAL; INTRATHECAL; INTRAVENOUS at 06:40

## 2017-05-21 RX ADMIN — LIDOCAINE HYDROCHLORIDE,EPINEPHRINE BITARTRATE 5 ML: 20; .005 INJECTION, SOLUTION EPIDURAL; INFILTRATION; INTRACAUDAL; PERINEURAL at 06:10

## 2017-05-21 RX ADMIN — ACETAMINOPHEN 975 MG: 325 TABLET, FILM COATED ORAL at 23:43

## 2017-05-21 RX ADMIN — HYDROMORPHONE HYDROCHLORIDE 0.5 MG: 1 INJECTION, SOLUTION INTRAMUSCULAR; INTRAVENOUS; SUBCUTANEOUS at 08:22

## 2017-05-21 RX ADMIN — SODIUM CHLORIDE, POTASSIUM CHLORIDE, SODIUM LACTATE AND CALCIUM CHLORIDE 1000 ML: 600; 310; 30; 20 INJECTION, SOLUTION INTRAVENOUS at 05:45

## 2017-05-21 RX ADMIN — HYDROMORPHONE HYDROCHLORIDE 0.5 MG: 1 INJECTION, SOLUTION INTRAMUSCULAR; INTRAVENOUS; SUBCUTANEOUS at 09:07

## 2017-05-21 ASSESSMENT — ENCOUNTER SYMPTOMS: SEIZURES: 0

## 2017-05-21 NOTE — PLAN OF CARE
"Problem: Goal Outcome Summary  Goal: Goal Outcome Summary  Outcome: Improving  Pain is under control with duramorph, toradol, and tylenol.  Pt tolerated crackers and clear liquid well.  Assisted Pt up to bathroom.  Pt stated that she has been feeling \"slight dizziness\" for the last couple of weeks. But Pt was able to walk to bathroom well.  Urvashi cares performed.  Assisted Pt back to bed.  Continues to monitor Pt.      "

## 2017-05-21 NOTE — ANESTHESIA PREPROCEDURE EVALUATION
Anesthesia Evaluation     .             ROS/MED HX    ENT/Pulmonary:       Neurologic:       Cardiovascular:     (+) hypertension (pregnancy induced)----. : . . . :. .       METS/Exercise Tolerance:     Hematologic:         Musculoskeletal:         GI/Hepatic:     (+) GERD       Renal/Genitourinary:         Endo:     (+) Obesity, .      Psychiatric:         Infectious Disease:         Malignancy:         Other:                                    Anesthesia Plan      History & Physical Review      ASA Status:  2 .        Plan for Epidural          Postoperative Care  Postoperative pain management:  Neuraxial analgesia.      Consents                          .

## 2017-05-21 NOTE — PROGRESS NOTES
"OB Progress Note  2017  5:20 AM    S:  Continues to have late decels with Pitocin.  SVE 7/70/-3. Trial pushes x2, unable to reduce cervix.       O:  /64  Temp 97.5  F (36.4  C) (Temporal)  Resp 16  Ht 1.651 m (5' 5\")  Wt 122 kg (269 lb)  SpO2 100%  BMI 44.76 kg/m2  EFM: baseline 140,  Periodic late decelerations, mod variability;  [place]:  Ctx q2-6 min  SVE:  7/70%/-3  Membranes: SROM    A/P:  31 year old  @37w6d  -Dr Hansen called for consult, probable c/s for fetal intolerance to labor    Lakia Fischer CNM    "

## 2017-05-21 NOTE — BRIEF OP NOTE
[facility] Brief Operative Note    Pre-operative diagnosis: IUP at 37+6  Non reassuring fetal status remote from delivery  Gestational hypertension   Post-operative diagnosis Same   Procedure: Procedure(s):   - Wound Class: I-Clean   Surgeon(s): Surgeon(s) and Role:     * Johnna Hansen MD - Primary   Estimated blood loss: *750 cc   Specimens:   ID Type Source Tests Collected by Time Destination   1 :  Blood, arterial Blood SEE PROVIDERS ORDERS Johnna Hansen MD 5/21/2017  6:51 AM    2 :  Blood, venous Blood SEE PROVIDERS ORDERS Johnna Hansen MD 5/21/2017  6:51 AM    3 :  Cord blood Blood OR DOCUMENTATION ONLY Johnna Hansen MD 5/21/2017  6:51 AM       Findings: Viable female infant, weight 7+9, Apgars 7, 8, & 9

## 2017-05-21 NOTE — ANESTHESIA POSTPROCEDURE EVALUATION
Patient: Linda Ricketts    * No procedures listed *    Diagnosis:* No pre-op diagnosis entered *  Diagnosis Additional Information: No value filed.    Anesthesia Type:  No value filed.    Note:  Anesthesia Post Evaluation    Patient location during evaluation: floor (Postpartum Unit)  Patient participation: Able to fully participate in evaluation  Level of consciousness: awake and alert  Pain management: adequate  Airway patency: patent  Cardiovascular status: acceptable  Respiratory status: acceptable  Hydration status: acceptable  PONV: none     Anesthetic complications: None    Comments: Pt doing well.  Denies epidural-related complaints.          Last vitals:  Vitals:    05/21/17 0900 05/21/17 0915 05/21/17 0946   BP: 125/62 118/59 111/55   Resp: 16 16 16   Temp:   37.4  C (99.3  F)   SpO2: 95% 95% 96%         Electronically Signed By: Suhail Morgan MD  May 21, 2017  10:28 AM

## 2017-05-21 NOTE — PLAN OF CARE
191 - Report received from EUSEBIO Villalba RN. Will assume cares.  2220 - Pt noticeably uncomfortable. States not coping with back labor and is requesting epidural. Consents signed and in chart. IV fluid bolus started. Patient educated on frequent position changes and vitals after epidural placement. Patient in agreement.  2310 - MDA in room.   2320 - Test dose given.    2326 - Epidural bolus given. Patient in left tilt.  0000 - Patient states having relief from epidural. Requesting CNM to perform SVE.  0020 - Robin catheter inserted. Patient tolerated procedure without issues. Clear yellow urine returned.  0030 - Late decels noted. RN and CNM at bedside, repositioned pt, O2 on, fluid bolus initiated.   0100 - SROM. Large amount of clear amniotic fluid.    0110 - Dr Hansen at bedside.   0205 - Patient and spouse educated on medication. In agreement with plan of care. Low-dose Pitocin started.   0230 - CNM notified of late decels.   0235 - Pitocin off due to late decels. Patient repositioned to alternate side, O2 on, fluid bolus initiated. Orders received to start low-dose Pitocin when FHTs reassuring.  0430 - Patient able to rest. Late decels persist. RN at bedside, repositioned pt, O2 on, fluid bolus initiated.   0447 - CNM notified of late decels.   0545 - IV fluid bolus started prior to . Patient prepped for surgery. Handoff given to HEAVEN Colon RN who will circulate in OR.

## 2017-05-21 NOTE — PROGRESS NOTES
"OB Progress Note  2017  7:47 PM    S:  Some cramping still present      O:  /67  Temp 97.8  F (36.6  C) (Temporal)  Resp 16  Ht 1.651 m (5' 5\")  Wt 122 kg (269 lb)  BMI 44.76 kg/m2  EFM: baseline 135, accelerations acel, neg decelerations, mod variability; Category I, GBS +  [place]:  Ctx  Irregular q 5 min  SVE:  1.5/50%/-3 ballotable  Membranes: intact    A/P:  31 year old  @37w5d admitted with gestational hypertension, IOL  1. Pt has received cervidil x1. Cytotec 25mcg pv x 2 and cytotec po x2. Plan to place sandoval bulb catheter for further cervical ripening and then start Pitocin 4 hours after last dose of cytotec @ 2230  2. Continue to monitor for progress      Lakia Fischer CNM    "

## 2017-05-21 NOTE — ANESTHESIA PREPROCEDURE EVALUATION
"Procedure: Procedure(s):   SECTION  Preop diagnosis: pregnancy  Allergies   Allergen Reactions     Ceclor [Cefaclor] Hives     Penicillins Hives     Sulfa Drugs Hives     Patient Active Problem List   Diagnosis     Labor and delivery indication for care or intervention     Indication for care in labor or delivery     Past Medical History:   Diagnosis Date     Hypertension     with pregnancy     History reviewed. No pertinent surgical history.    No current facility-administered medications on file prior to encounter.   Current Outpatient Prescriptions on File Prior to Encounter:  Prenatal Vit-Fe Fumarate-FA (PRENATAL VITAMIN PO)    Cetirizine HCl (ZYRTEC ALLERGY PO)    Acetaminophen (TYLENOL PO)      /64  Temp 36.4  C (97.5  F) (Temporal)  Resp 16  Ht 1.651 m (5' 5\")  Wt 122 kg (269 lb)  SpO2 100%  BMI 44.76 kg/m2    Lab Results   Component Value Date    WBC 7.2 2017     Lab Results   Component Value Date    RBC 3.46 2017     Lab Results   Component Value Date    HGB 11.5 2017     Lab Results   Component Value Date    HCT 33.3 2017     Lab Results   Component Value Date    MCV 96 2017     Lab Results   Component Value Date    MCH 33.2 2017     Lab Results   Component Value Date    MCHC 34.5 2017     Lab Results   Component Value Date    RDW 13.5 2017     Lab Results   Component Value Date     2017     No results found for: INR    Last Basic Metabolic Panel:  Lab Results   Component Value Date     2017      Lab Results   Component Value Date    POTASSIUM 4.1 2017     Lab Results   Component Value Date    CHLORIDE 109 2017     Lab Results   Component Value Date    EMANUEL 9.1 2017     Lab Results   Component Value Date    CO2 20 2017     Lab Results   Component Value Date    BUN 7 2017     Lab Results   Component Value Date    CR 0.44 2017     Lab Results   Component Value Date    GLC 90 " 05/19/2017     Anesthesia Evaluation     . Pt has had prior anesthetic.     No history of anesthetic complications          ROS/MED HX    ENT/Pulmonary:     (+)MARLENA risk factors obese, allergic rhinitis, , . .   (-) sleep apnea and recent URI   Neurologic:      (-) seizures, CVA and migraines   Cardiovascular:     (+) hypertension (pregnancy induced)----. : . . . :. .      (-) CHF and COSBY   METS/Exercise Tolerance:     Hematologic:  - neg hematologic  ROS       Musculoskeletal:  - neg musculoskeletal ROS       GI/Hepatic:     (+) GERD       Renal/Genitourinary:  - ROS Renal section negative       Endo:     (+) Obesity, .   (-) Type II DM and thyroid disease   Psychiatric:  - neg psychiatric ROS       Infectious Disease:         Malignancy:      - no malignancy   Other: Comment: Fetal intolerance to labor   (+) Possibly pregnant                    Physical Exam  Normal systems: cardiovascular, pulmonary and dental    Airway   Mallampati: III  TM distance: >3 FB  Neck ROM: full    Dental     Cardiovascular   Rhythm and rate: regular and normal      Pulmonary    breath sounds clear to auscultation                    Anesthesia Plan      History & Physical Review  History and physical reviewed and following examination; no interval change.    ASA Status:  2 .    NPO Status:  > 6 hours    Plan for Epidural   PONV prophylaxis:  Ondansetron (or other 5HT-3)       Postoperative Care  Postoperative pain management:  IV analgesics and Neuraxial analgesia.      Consents  Anesthetic plan, risks, benefits and alternatives discussed with:  Patient and Spouse..                          .

## 2017-05-21 NOTE — ANESTHESIA PROCEDURE NOTES
Peripheral nerve/Neuraxial procedure note : epidural catheter  Pre-Procedure  Performed by WELLINGTON WRIGHT  Location: OB      Pre-Anesthestic Checklist: patient identified, IV checked, risks and benefits discussed, informed consent and pre-op evaluation    Timeout  Correct Patient: Yes   Correct Procedure: Yes   Correct Site: Yes   Correct Laterality: N/A   Correct Position: Yes   Site Marked: N/A   .   Procedure Documentation    .    Procedure:    Epidural catheter.  Insertion Site:L3-4  (midline approach) Injection technique: LORT air   Local skin infiltrated with 3 mL of 1% lidocaine.  GENI at 7.5 cm     Patient Prep;mask, sterile gloves, povidone-iodine 7.5% surgical scrub, patient draped.  .  Needle: Touhy needle (17 G. 3.5 in). # of attempts: 2.  # of redirects: 2.  Spinal Needle: . . . Catheter: 19 G . .  Catheter threaded easily  3.5 cm epidural space.  .   .    Assessment/Narrative  Paresthesias: Yes and Resolved (paresthesia left buttock / knee when threading catheter.  resolved spontaneously).  .  .  Aspiration negative for heme or CSF  . Test dose of 3 mL lidocaine 1.5% w/ 1:200,000 epinephrine at. Test dose negative for signs of intravascular, subdural or intrathecal injection. Comments:  Patient tolerated procedure well   No complications   Epidural bolused with 0.2% ropivacaine with 100 mcg fentanyl

## 2017-05-21 NOTE — PLAN OF CARE
Problem: Goal Outcome Summary  Goal: Goal Outcome Summary  Outcome: Improving  Pt. admitted from L&D via bed.. Pt. arrived with baby and was accompanied by , nurse, and arrived with personal belongings. Report was taken from Stacy Buitrago RN in L&D.  Pt. is A&Ox3 and VSS on RA. Fundus is firm and midline.  Vaginal bleeding is small.   Pt. c/o 0/10 pain. Pt. Denied feeling nausea, CP, SOB, lightheadedness, or dizziness. LS clear and BS hypoactive. PIV patent and infusing.  Robin patent and draining.  Dressing to lower abdomen CDI.  Pneumoboots in place to BLE.  Pt. oriented to the room and call light system.

## 2017-05-21 NOTE — PROGRESS NOTES
"OB Progress Note  2017  2:41 AM    S:  Called to room @ 0230 by RN for late decelerations. Pitocin at 1mu/min. Turned off after 4th late decel, pt repositioned, 10L O2  Via face mask in place, IV fluid bolus infusing.  No decelerations with contractions now.       O:  /64  Temp 97.5  F (36.4  C) (Temporal)  Resp 16  Ht 1.651 m (5' 5\")  Wt 122 kg (269 lb)  SpO2 100%  BMI 44.76 kg/m2  EFM: baseline 150. Mod variability, no decelerations at present. Cat I  [place]:  Ctx q2-5 min  SVE:  6/70%/-3  Membranes: SROM    A/P:  31 year old  @37w6d, SROM, afebrile, GBS pos, early labor  1.  Monitor closely for progress and fetal heart tracing  2.  Continue O2  3.  Pitocin off for now  4.  Eval prn    Lakia Fischer CNM    "

## 2017-05-21 NOTE — PROGRESS NOTES
"OB Progress Note  2017  1:26 AM    S:  Called to see patient due to late decelerations. Time of call to me 00:53 am, I was at bedside by 01:10 am.  Patient being induced for gestational hypertension. S/p cervidil (removed prematurely after 5 hours), then 25mcg cytotec PV x 2, then 25 mcg cytotec PO x 2, then sandoval catheter attempt.  At  patient had changed to 4 cm, and decision made to place epidural before starting pitocin as patient was becoming uncomfortable. Epidural placed at 2330 without complication. Pitocin has not been started and at 0030 tracing showed four prolonged late decelerations that resolved with position change and O2. Of note, patient experienced SROM at 0100 just minute prior to my arrival.     Pt with epidural for pain control. Feeling comfortable. No other complaints.      O:  /64  Temp 97.5  F (36.4  C) (Temporal)  Resp 16  Ht 1.651 m (5' 5\")  Wt 122 kg (269 lb)  SpO2 100%  BMI 44.76 kg/m2  EFM: baseline 150, accelerations present, late decelerations x 20 minutes - now resolved - moderate variability; Category 2 (becoming category 1)  [place]:  Ctx difficult to assess - IUPC placed by me at this check.  SVE:  4/50%/-02  Membranes: SROM, clear at 0100    Lab Results   Component Value Date    WBC 7.2 2017    WBC 5.3 2017    HGB 11.5 (L) 2017    HGB 11.1 (L) 2017    HCT 33.3 (L) 2017    HCT 32.9 (L) 2017    MCV 96 2017    MCV 99 2017     2017     2017     Lab Results   Component Value Date    CR 0.44 (L) 2017    CR 0.46 (L) 2017     Lab Results   Component Value Date    AST 11 2017    AST 15 2017    ALT 11 2017    ALT 14 2017     Urine P/C ratio: 0.21 g/g    Pitocin at 0 mU/min    A/P:  31 year old  @37w6d admitted for IOL due to GHTN.   1.  Fetal heart rate tracing category 1 the past 20 minutes, very reassuring. Area of category 2 with recurrent late " decelerations has resolved. No evidence of hypoxia.  2.  Labor induction - IUPC now placed and MVUs are inadequate. Recommend pitocin  3.  GHTN - bps normal to mild range. No evidence of severe disease. Continue to monitor.  4. GBS +, on clinda  5. Elevated 1 hour, normal 3 hour with polyhydramnios - EFW 7-7.5 lbs. No concern for LGA baby at this time.  6. Anticipate     Johnna Hansen MD

## 2017-05-21 NOTE — PROGRESS NOTES
"OB Progress Note  2017  12:28 AM    S:  Comfortable with epidural.      O:  /64  Temp 97.5  F (36.4  C) (Temporal)  Resp 16  Ht 1.651 m (5' 5\")  Wt 122 kg (269 lb)  SpO2 100%  BMI 44.76 kg/m2  EFM: baseline 145, , neg decelerations, mod variability; Category I  [place]:  Ctx q2-5 min  SVE:  4-5/50%/-3 ballotable. Vertex confirmed with ultrasound  Membranes: intact    A/P:  31 year old  @37w6d, gestational hypertension, GBS pos, polyhydramnios  1.  Initiate Pitocin per low dose protocol  2.  Clindamycin per protocol until delivery  3.  Anticipate   4. Eval prn    Lakia Fischer CNM    "

## 2017-05-21 NOTE — PROGRESS NOTES
"OB Progress Note  2017  5:42 AM    S:  Pt with epidural for pain control.  Tired.  No other complaints.    O:  /64  Temp 97.5  F (36.4  C) (Temporal)  Resp 16  Ht 1.651 m (5' 5\")  Wt 122 kg (269 lb)  SpO2 100%  BMI 44.76 kg/m2  EFM: baseline 150, accelerations absent, intermittent late decelerations, moderate variability; Category 1  [place]:  Ctx q5-7 min  SVE:  6/80%/-2  Membranes: ruptured, clear    Pitocin at 0 mU/min    A/P:  31 year old  @37w6d admitted for IOL due to GHTN.   1.  Fetal heart rate tracing continues to be category 2, unable to augment with pitocin due to worsening late decelerations on pitocin. Recommend proceeding with delivery by  section.  2.  Patient consented for  section. Risk, benefits, alternatives discussed with patient to include, but not limited to, bleeding (blood transfusion risk 1 in 2 million HIV and 1 in 250K Hep C), infection, damage to surrounding organs,  hysterectomy, fetal injury. All questions answered.    Johnna Hansen MD    "

## 2017-05-21 NOTE — OP NOTE
DATE OF PROCEDURE: 2017       PREOPERATIVE DIAGNOSES:   1.  Intrauterine pregnancy at 37 weeks and 6 days gestation.   2.  Non-reassuring fetal status remote from delivery.   3.  Gestational hypertension.   4.  Maternal obesity.      POSTOPERATIVE DIAGNOSES:   1.  Intrauterine pregnancy at 37 weeks and 6 days gestation.   2.  Non-reassuring fetal status remote from delivery.   3.  Gestational hypertension.     4.  Maternal obesity.      PROCEDURE:  Primary low transverse  section.      ANESTHESIA:  Epidural.      COMPLICATIONS:  None.      ESTIMATED BLOOD LOSS:  750 mL.       URINE OUTPUT:  200 mL.      IV FLUID:  1 liter.      SPECIMENS REMOVED:  Placenta to hospital disposal.      FINDINGS:  Viable female infant, weight 7 pounds 9 ounces, Apgars of 7 at 1 minute, 8 at 5 minutes and 9 at 10 minutes.      INDICATIONS:  Linda Ricketts is a very pleasant 31-year-old female,  3, now para 3-0-0-3, who presented to Labor and Delivery at 37 weeks and 5 days gestation for induction of labor secondary to gestational hypertension.  The patient had been followed closely for mildly elevated blood pressures in clinic.  Labs came back normal.  The patient was counseled regarding risks, benefits, alternatives and consented to induction of labor.  The patient's prenatal course was notable for care at [facility] with the midwifery group.  She had routine care notable for obesity, elevated 1-hour glucose tolerance test with a normal 3-hour glucose tolerance test, GBS positive, polyhydramnios of 31.9 cm at 37 weeks, short pregnancy interval.        Pertinent prenatal labs include blood type A negative, antibody screen negative, rubella immune, VDRL nonreactive, HIV negative, hepatitis B negative, gonorrhea/chlamydia negative.  Early 1-hour glucose tolerance test 121, third trimester glucose tolerance test 145, and 3-hour glucose tolerance test 91, 154, 133 and 77.  Group B strep positive on 2017.        HOSPITAL COURSE:  The patient was admitted as mentioned for induction of labor.  She underwent cervical ripening with a short duration of Cervidil, which fell out early, vaginal Cytotec twice and then oral Cytotec twice.  At this point, the patient had changed to 4 cm and was jenniffer and desired an epidural.  An epidural was placed, and within the hour after that, the patient had some late decelerations.  I was called to consult and evaluate the patient.  At that time, fetal heart rate tracing improved to category 1.  Labor induction was continued with Pitocin.  Just prior to this, spontaneous rupture of membranes had occurred at 0100, clear fluid resulting.  Going forward, labor course was notable for repetitive late decelerations using Pitocin.  Pitocin was unable to be used to augment labor.  I counseled the patient extensively regarding risks of running her labor course, and she consented to a primary low transverse  section due to non-reassuring fetal status remote from delivery.      DESCRIPTION OF PROCEDURE:  The patient was taken to the operating room, where epidural anesthesia was found to be adequate.  The patient was prepped and draped in normal sterile fashion in supine position with leftward tilt.  Timeout was performed, confirming patient and procedure.      First, a Pfannenstiel skin incision was made with a knife and carried down to the underlying fascia with the Bovie.  The fascia was incised in the midline and extended laterally with Montano scissors.  The fascia was then elevated off the superior aspect and elevated off the rectus muscles, which were dissected off sharply and bluntly.  Attention was then turned to the inferior aspect of the fascia, which was also grasped with Kocher clamps x2 and the underlying rectus muscles dissected off sharply and bluntly.  The rectus muscles were  in midline, peritoneum identified and carefully elevated with hemostats and entered sharply  with Metzenbaum scissors.  The small intestine was noted peristalsing under the peritoneum, but had been well away from any surgical incision.  The peritoneum was then carried carefully inferiorly with Metzenbaum scissors.  The peritoneum was extended laterally with manual traction.  The uterus was palpated and small bowel easily pushed out of the surgical field.  The bladder blade was placed in the vesicouterine fold and the bladder flap created first sharply and then bluntly along the lower uterine segment.      A low transverse incision was made with a knife and the uterine cavity entered bluntly.  Clear fluid resulted.  The umbilical cord was noted immediately upon entry into the cavity.  The infant was then delivered occiput posterior in the vertex presentation with shoulders and body following without issues.  The cord had been easily reduced, and after approximately 30-60 seconds, the umbilical cord was clamped x2 and cut.  Delivery was of a viable female infant.  Weight was 7 pounds 9 ounces, Apgars of 7 at 1 minute, 8 at 5 minutes and 9 at 10 minutes.      Cord gases were collected and the placenta delivered manually with trailing membranes.  The uterus was exteriorized and cleared of all clots and debris.  The myotomy was closed with 0 Vicryl in a running locked fashion.  The location of the myotomy was low along the lower uterine segment and involved some of the cervix.  This required extra care when suturing so as to not tear through this more delicate tissue.  This layer was successfully closed in a running locked fashion.  A second stitch of the same suture was used to imbricate.  Some areas of oozing were still noted, so several figure-of-eight sutures were placed.  Excellent closure was ultimately obtained with good hemostasis.      Uterus was replaced into the pelvis and the pelvis was copiously irrigated and cleared of all clots and debris.  Myotomy was reexamined and hemostatic.  The rectus  muscles and fascia were examined and hemostatic.  The fascia was closed with 0 Vicryl in a running fashion.  Subcutaneous tissue was irrigated and made hemostatic focally with Bovie electrosurgery.  The subcutaneous tissue was reapproximated with 3-0 Vicryl and skin closed with staples.      The patient tolerated the procedure well.  Sponge, instrument and needle counts were correct x3.  The patient was taken to recovery room in stable condition.         OLE HANSEN MD             D: 2017 07:25   T: 2017 14:32   MT: [id number]      Name:     KYMBERLY PALMER   MRN:      [id number]        Account:        [id number]   :      1985           Procedure Date: 2017      Document: [id number]       cc: Ole Hansen MD

## 2017-05-21 NOTE — ANESTHESIA CARE TRANSFER NOTE
Patient: Linda Ricketts    Procedure(s):   - Wound Class: I-Clean    Diagnosis: pregnancy  Diagnosis Additional Information: No value filed.    Anesthesia Type:   Epidural     Note:  Airway :Room Air  Patient transferred to:Labor and Delivery  Comments: Pt to L&D recovery VSS. Report complete to RN      Vitals: (Last set prior to Anesthesia Care Transfer)    CRNA VITALS  5/21/2017 0639 - 5/21/2017 0713      5/21/2017             Resp Rate (set): 10                Electronically Signed By: Lata Nettles CRNA, APRN CRNA  May 21, 2017  7:13 AM

## 2017-05-21 NOTE — ANESTHESIA POSTPROCEDURE EVALUATION
Patient: Linda Ricketts    Procedure(s):   - Wound Class: I-Clean    Diagnosis:pregnancy  Diagnosis Additional Information: No value filed.    Anesthesia Type:  Epidural    Note:  Anesthesia Post Evaluation    Patient location during evaluation: floor (Postpartum Unit)  Patient participation: Able to fully participate in evaluation  Level of consciousness: awake and alert  Pain management: adequate  Airway patency: patent  Cardiovascular status: acceptable  Respiratory status: acceptable  Hydration status: acceptable  PONV: none     Anesthetic complications: None    Comments: Pt doing well.  Denies epidural-related complaints.          Last vitals:  Vitals:    05/21/17 0900 05/21/17 0915 05/21/17 0946   BP: 125/62 118/59 111/55   Resp: 16 16 16   Temp:   37.4  C (99.3  F)   SpO2: 95% 95% 96%         Electronically Signed By: Suhail Morgan MD  May 21, 2017  10:28 AM

## 2017-05-22 LAB — HGB BLD-MCNC: 10.1 G/DL (ref 11.7–15.7)

## 2017-05-22 PROCEDURE — 25000132 ZZH RX MED GY IP 250 OP 250 PS 637: Performed by: OBSTETRICS & GYNECOLOGY

## 2017-05-22 PROCEDURE — 12000037 ZZH R&B POSTPARTUM INTERMEDIATE

## 2017-05-22 PROCEDURE — 36416 COLLJ CAPILLARY BLOOD SPEC: CPT | Performed by: OBSTETRICS & GYNECOLOGY

## 2017-05-22 PROCEDURE — 85018 HEMOGLOBIN: CPT | Performed by: OBSTETRICS & GYNECOLOGY

## 2017-05-22 PROCEDURE — 25000128 H RX IP 250 OP 636: Performed by: OBSTETRICS & GYNECOLOGY

## 2017-05-22 RX ADMIN — OXYCODONE HYDROCHLORIDE 10 MG: 5 TABLET ORAL at 10:51

## 2017-05-22 RX ADMIN — OXYCODONE HYDROCHLORIDE 10 MG: 5 TABLET ORAL at 18:36

## 2017-05-22 RX ADMIN — ACETAMINOPHEN 975 MG: 325 TABLET, FILM COATED ORAL at 16:03

## 2017-05-22 RX ADMIN — IBUPROFEN 800 MG: 400 TABLET ORAL at 07:50

## 2017-05-22 RX ADMIN — SENNOSIDES AND DOCUSATE SODIUM 1 TABLET: 8.6; 5 TABLET ORAL at 07:51

## 2017-05-22 RX ADMIN — OXYCODONE HYDROCHLORIDE 10 MG: 5 TABLET ORAL at 14:47

## 2017-05-22 RX ADMIN — SENNOSIDES AND DOCUSATE SODIUM 2 TABLET: 8.6; 5 TABLET ORAL at 20:13

## 2017-05-22 RX ADMIN — OXYCODONE HYDROCHLORIDE 10 MG: 5 TABLET ORAL at 21:59

## 2017-05-22 RX ADMIN — KETOROLAC TROMETHAMINE 30 MG: 30 INJECTION, SOLUTION INTRAMUSCULAR at 02:36

## 2017-05-22 RX ADMIN — ACETAMINOPHEN 975 MG: 325 TABLET, FILM COATED ORAL at 07:51

## 2017-05-22 RX ADMIN — IBUPROFEN 800 MG: 400 TABLET ORAL at 14:47

## 2017-05-22 RX ADMIN — IBUPROFEN 800 MG: 400 TABLET ORAL at 20:13

## 2017-05-22 RX ADMIN — OXYCODONE HYDROCHLORIDE 10 MG: 5 TABLET ORAL at 07:51

## 2017-05-22 NOTE — PLAN OF CARE
Problem: Goal Outcome Summary  Goal: Goal Outcome Summary  Outcome: Improving  VSS. Pain managed with Tylenol and Toradol. FFU1. Incision is CDI. Robin Removed at 6 am,  Void x1. Up independently. Baby bottle fed formula in Nursery overnight. SL in place. Will continue to monitor.

## 2017-05-22 NOTE — PROGRESS NOTES
"OB Post-op  Section Progress Note POD# 1    S:  Patient doing well.  Pain well controlled with oral pain medication.  Ambulating.  Tolerating reg diet.  No N/V.    Voiding.  Bleeding is normal. Formula feeding.    O:  /74  Pulse 90  Temp 98.2  F (36.8  C)  Resp 16  Ht 1.651 m (5' 5\")  Wt 122 kg (269 lb)  SpO2 99%    BMI 44.76 kg/m2  UOP- 24hr , Since MN 1200  Gen- A&O, NAD  Abd- soft, non-tender, +BS, no rebound or guarding, fundus firm at umbilicus  Incision- C/D/I, there is area area of seepage on the left lower quadrant of the dressing that appears new.   Ext- non-tender, +1 pittingedema. Pt ambulating just prior to interview.     Hemoglobin   Date Value Ref Range Status   2017 11.5 (L) 11.7 - 15.7 g/dL Final     A neg  Rubella immune    A/P:  31 year old  POD# 1 s/p primary LTCS for fetal distress.     1.  Routine post-op cares  2.  Anticipate d/c home on POD#2 or 3    Temi Yu  2017  8:22 AM     "

## 2017-05-22 NOTE — PLAN OF CARE
Problem: Goal Outcome Summary  Goal: Goal Outcome Summary  Outcome: Improving  VSS.  Pt eating and drinking well today.  Pt ambulating frequently in room and showered independently this shift without difficulty.  Pt reports that her pain is well controlled with PO Tylenol, Ibuprofen, 10 mg Oxycodone - all when they are due, as well as use of abdominal binder.  C/s incision dressing removed after pt showered. Incision closed with staples, is free from any redness, swelling, or drainage, and is left open to air.  IV saline lock removed this shift.  Pt has been voiding well this shift.  Bottle feeding infant is going well.  Pt denies any concerns at this time.  Will continue to monitor.

## 2017-05-22 NOTE — PLAN OF CARE
Problem: Goal Outcome Summary  Goal: Goal Outcome Summary  Outcome: Improving  Patient doing well this evening. VSS. Denies pain at this time. Taking scheduled toradol. BS present but hypoactive. No flatus yet. Tolerating some soup and crackers. Diet advanced. Patient educated to eat small amounts initially. Dressing C/D/I. Robin draining adequate amounts of clear yellow urine. Continue plan of care.

## 2017-05-23 PROCEDURE — 12000037 ZZH R&B POSTPARTUM INTERMEDIATE

## 2017-05-23 PROCEDURE — 25000132 ZZH RX MED GY IP 250 OP 250 PS 637: Performed by: OBSTETRICS & GYNECOLOGY

## 2017-05-23 RX ADMIN — ACETAMINOPHEN 975 MG: 325 TABLET, FILM COATED ORAL at 07:57

## 2017-05-23 RX ADMIN — IBUPROFEN 800 MG: 400 TABLET ORAL at 07:56

## 2017-05-23 RX ADMIN — OXYCODONE HYDROCHLORIDE 5 MG: 5 TABLET ORAL at 07:56

## 2017-05-23 RX ADMIN — SENNOSIDES AND DOCUSATE SODIUM 2 TABLET: 8.6; 5 TABLET ORAL at 07:56

## 2017-05-23 RX ADMIN — IBUPROFEN 800 MG: 400 TABLET ORAL at 01:50

## 2017-05-23 RX ADMIN — ACETAMINOPHEN 975 MG: 325 TABLET, FILM COATED ORAL at 01:50

## 2017-05-23 RX ADMIN — IBUPROFEN 800 MG: 400 TABLET ORAL at 14:03

## 2017-05-23 RX ADMIN — ACETAMINOPHEN 975 MG: 325 TABLET, FILM COATED ORAL at 15:40

## 2017-05-23 RX ADMIN — SENNOSIDES AND DOCUSATE SODIUM 1 TABLET: 8.6; 5 TABLET ORAL at 21:03

## 2017-05-23 RX ADMIN — OXYCODONE HYDROCHLORIDE 10 MG: 5 TABLET ORAL at 01:49

## 2017-05-23 RX ADMIN — OXYCODONE HYDROCHLORIDE 5 MG: 5 TABLET ORAL at 21:03

## 2017-05-23 RX ADMIN — IBUPROFEN 800 MG: 400 TABLET ORAL at 20:08

## 2017-05-23 NOTE — PROGRESS NOTES
"OB CS post op note  POD# 2    S:  Patient doing well.  Pain controlled with ibuprofen and oxycodone.  Analilia reg diet,  Voiding without difficulty, passing flatus, no BM yet. ambulating,  Bleeding light.  Bottlefeeding. Feeling really good    O:  /77  Pulse 95  Temp 97.7  F (36.5  C) (Oral)  Resp 16  Ht 1.651 m (5' 5\")  Wt 122 kg (269 lb)  SpO2 99%   BMI 44.76 kg/m2  No intake or output data in the 24 hours ending 17 0839   Gen- A&O, NAD  PULM: CTAB  CV: RRR  Abd- Non-tender, fundus firm at umbilicus  Incision: staples intact, well approx, no redness or drainage present  Ext- non-tender, +1 pedal edema    Hemoglobin   Date Value Ref Range Status   2017 10.1 (L) 11.7 - 15.7 g/dL Final     A-  Rubella Immune    A/P: 31 year old  POD# 2 s/p primaryLTCS for fetal intolerance to labor, Rh neg, bottle feeding, gestational hypertension    1.  Routine post-partum cares.   - Pain ibuprofen and oxycodone   - Diet reg   - Ambulate    - IS as needed     2.  Anticipate d/c home on POD# 3 or 4.    Lakia Fischer  2017  8:39 AM    "

## 2017-05-23 NOTE — PLAN OF CARE
Problem: Goal Outcome Summary  Goal: Goal Outcome Summary  Outcome: Improving  VSS. Pain managed with Tylenol, Ibuprofen and Oxycodone 10mg. FFU1 with scant flow. Voiding without difficulties, Up independently. Incision is open to air no dressing , closed with staples and WDL. Baby bottle fed formula in nursery during the night. Will continue to monitor.

## 2017-05-23 NOTE — PLAN OF CARE
Problem: Goal Outcome Summary  Goal: Goal Outcome Summary  Outcome: Improving  VSS, afebrile. FF-1, scant flow, no clots.  Bonding well with .  Bottle feeding on demand.  Up ad brianda and showered independently.  Incision open to air with staples WNL. Voiding WNL.  Taking Tylenol and Ibuprofen and for pain and trying to limit Oxycodone.   Will continue to monitor and update MD as needed.

## 2017-05-23 NOTE — PLAN OF CARE
Problem: Goal Outcome Summary  Goal: Goal Outcome Summary  Outcome: No Change  Vitals stable. Pain controlled with tylenol and ibuprofen. She is independent with all cares. Continue to monitor.

## 2017-05-23 NOTE — PLAN OF CARE
Problem: Goal Outcome Summary  Goal: Goal Outcome Summary  Outcome: No Change  VSS. Fundus firm at midline. Staples intact. Up ambulating in room/rangel independently. Having adequate urine output. Pain well controlled with Tylenol, Ibuprofen and Oxycodone.  Bottle feeding  with formula every 2-3 hours. Working on  cares. Continue to monitor and notify MD as needed.

## 2017-05-24 VITALS
WEIGHT: 269 LBS | RESPIRATION RATE: 14 BRPM | DIASTOLIC BLOOD PRESSURE: 76 MMHG | SYSTOLIC BLOOD PRESSURE: 125 MMHG | BODY MASS INDEX: 44.82 KG/M2 | HEART RATE: 68 BPM | HEIGHT: 65 IN | TEMPERATURE: 97.9 F | OXYGEN SATURATION: 99 %

## 2017-05-24 PROBLEM — O13.9 GESTATIONAL HYPERTENSION: Status: ACTIVE | Noted: 2017-05-24

## 2017-05-24 PROCEDURE — 25000132 ZZH RX MED GY IP 250 OP 250 PS 637: Performed by: OBSTETRICS & GYNECOLOGY

## 2017-05-24 RX ORDER — OXYCODONE HYDROCHLORIDE 5 MG/1
5-10 TABLET ORAL
Qty: 30 TABLET | Refills: 0 | Status: SHIPPED | OUTPATIENT
Start: 2017-05-24

## 2017-05-24 RX ORDER — IBUPROFEN 200 MG
400-800 TABLET ORAL EVERY 6 HOURS PRN
COMMUNITY
Start: 2017-05-24

## 2017-05-24 RX ADMIN — OXYCODONE HYDROCHLORIDE 5 MG: 5 TABLET ORAL at 09:20

## 2017-05-24 RX ADMIN — SENNOSIDES AND DOCUSATE SODIUM 1 TABLET: 8.6; 5 TABLET ORAL at 09:20

## 2017-05-24 RX ADMIN — ACETAMINOPHEN 650 MG: 325 TABLET, FILM COATED ORAL at 12:10

## 2017-05-24 RX ADMIN — IBUPROFEN 800 MG: 400 TABLET ORAL at 12:10

## 2017-05-24 RX ADMIN — IBUPROFEN 800 MG: 400 TABLET ORAL at 05:55

## 2017-05-24 RX ADMIN — OXYCODONE HYDROCHLORIDE 5 MG: 5 TABLET ORAL at 12:10

## 2017-05-24 RX ADMIN — OXYCODONE HYDROCHLORIDE 5 MG: 5 TABLET ORAL at 05:55

## 2017-05-24 RX ADMIN — ACETAMINOPHEN 975 MG: 325 TABLET, FILM COATED ORAL at 00:03

## 2017-05-24 NOTE — PLAN OF CARE
Problem: Goal Outcome Summary  Goal: Goal Outcome Summary  Outcome: Improving  VSS, pain controlled well with occasional Oxycodone, Ibuprofen and Tylenol, bottle feeding infant formula, instructed to call with questions or concerns, call light within reach, no concerns at this time, will continue to monitor.

## 2017-05-24 NOTE — PROGRESS NOTES
"OB Post-op  Section Progress Note POD# 3    S:  Patient doing well.  Pain well controlled with oral pain medication.  Ambulating.  Tolerating reg diet.  No N/V.  Passing flatus.  Voiding.  Bleeding scant.  Bottlefeeding by choice.     O:  /76  Pulse 68  Temp 97.9  F (36.6  C) (Oral)  Resp 14  Ht 1.651 m (5' 5\")  Wt 122 kg (269 lb)  SpO2 99%  Breastfeeding? Unknown  BMI 44.76 kg/m2    Gen- A&O, NAD  Abd- soft, non-tender, +BS, no rebound or guarding, fundus firm at umbilicus  Incision- C/D/I  Ext- +2 edema.     Hemoglobin   Date Value Ref Range Status   2017 10.1 (L) 11.7 - 15.7 g/dL Final     A   Rubella imm    A/P:  31 year old  POD# 3 s/p primaryLTCS for fetal distress    1.  Routine post-op cares  2.  Anticipate d/c home today.  3. Warning signs reviewed.       Temi Yu  2017  8:24 AM   "

## 2017-06-08 NOTE — DISCHARGE SUMMARY
DATE OF ADMISSION:  2017      DATE OF DISCHARGE:  2017      DISCHARGE DIAGNOSES:   1.  Intrauterine pregnancy at 37 weeks and 6 days, delivered.   2.  Status post primary low transverse  section.   3.  Gestational hypertension.      PRINCIPAL PROCEDURE:  Primary low transverse  section.      DISCHARGE MEDICATIONS:   1.  Oxycodone 5 mg 1-2 tablets by mouth every 3 hours as needed for moderate to severe pain.   2.  Ibuprofen 200 mg, take 2-4 tablets by mouth every 6 hours as needed for cramping.   3.  Prenatal vitamins 1 p.o. daily.      DISCHARGE FOLLOWUP:  Linda Ricketts is to follow up in 6 weeks for a postpartum and postoperative check.      DISCHARGE INSTRUCTIONS:  The patient is to call for a temperature greater than 100.4, heavy vaginal bleeding, redness or drainage from the incision site.      HOSPITAL COURSE:  Linda Ricketts is a 31-year-old female,  3, now para 3-0-0-3 who presented to Labor and Delivery at 37 weeks and 5 days' gestation for induction of labor secondary to gestational hypertension.  She underwent cervical ripening with Cytotec and Cervidil and labor course was further attempted with a Robin catheter for cervical ripening.  Plan was to start Pitocin, but due to prolonged late decelerations that resolved with position change and O2 this was not started.  The patient did have spontaneous rupture of membranes during her labor course.  However, upon further evaluation of overall fetal heart rate tracing, category 2, remote from delivery with cervical exam 6 cm dilated, and unable to augment with Pitocin, I recommended proceeding with  section.  The patient and her  consented after asking appropriate questions.      The patient underwent an uncomplicated primary low transverse  section.  She had a routine postoperative course, ambulated, tolerated a regular diet, had adequate pain control and remained afebrile.  Her postoperative hemoglobin was  10.1.  The patient was discharged home on postoperative day #3 in stable condition.         OLE COOL MD             D: 2017 09:11   T: 2017 09:31   MT: [id number]      Name:     KYMBERLY PALMER   MRN:      [id number]        Account:        [id number]   :      1985           Admit Date:     [id number]                                  Discharge Date: 2017      Document: [id number]

## 2018-08-09 NOTE — PLAN OF CARE
Problem: Goal Outcome Summary  Goal: Goal Outcome Summary  Outcome: Adequate for Discharge Date Met:  05/24/17  D: VSS, assessments WDL.   I: Pt. received complete discharge paperwork and home medications as filled by discharge pharmacy.  Pt. was given times of last dose for all discharge medications in writing on discharge medication sheets.  Discharge teaching included home medication, pain management, activity restrictions, postpartum cares, and signs and symptoms of infection.    A: Discharge outcomes on care plan met.  Mother states understanding and comfort with self and baby cares.  P: Pt. discharged to home.  Pt. was discharged with baby, and bands were checked at time of discharge.  Pt. was accompanied by , nurse and baby, and left with personal belongings.  Home care ordered.  Pt. to follow up with OB per MD order.  Pt. had no further questions at the time of discharge and no unmet needs were identified.       English

## 2019-11-22 ENCOUNTER — HOSPITAL PATHOLOGY (OUTPATIENT)
Dept: OTHER | Facility: CLINIC | Age: 34
End: 2019-11-22

## 2019-11-26 LAB — COPATH REPORT: NORMAL

## 2020-02-03 ENCOUNTER — HOSPITAL ENCOUNTER (EMERGENCY)
Facility: CLINIC | Age: 35
Discharge: HOME | End: 2020-02-03
Attending: EMERGENCY MEDICINE | Admitting: EMERGENCY MEDICINE
Payer: COMMERCIAL

## 2020-02-03 ENCOUNTER — APPOINTMENT (OUTPATIENT)
Dept: GENERAL RADIOLOGY | Facility: CLINIC | Age: 35
End: 2020-02-03
Attending: EMERGENCY MEDICINE
Payer: COMMERCIAL

## 2020-02-03 VITALS
OXYGEN SATURATION: 99 % | SYSTOLIC BLOOD PRESSURE: 138 MMHG | DIASTOLIC BLOOD PRESSURE: 90 MMHG | RESPIRATION RATE: 16 BRPM | HEART RATE: 73 BPM | WEIGHT: 255 LBS | TEMPERATURE: 97.4 F | BODY MASS INDEX: 42.43 KG/M2

## 2020-02-03 DIAGNOSIS — R07.9 CHEST PAIN, UNSPECIFIED TYPE: ICD-10-CM

## 2020-02-03 LAB
ANION GAP SERPL CALCULATED.3IONS-SCNC: 6 MMOL/L (ref 3–14)
BASOPHILS # BLD AUTO: 0 10E9/L (ref 0–0.2)
BASOPHILS NFR BLD AUTO: 0.4 %
BUN SERPL-MCNC: 10 MG/DL (ref 7–30)
CALCIUM SERPL-MCNC: 9.2 MG/DL (ref 8.5–10.1)
CHLORIDE SERPL-SCNC: 106 MMOL/L (ref 94–109)
CO2 SERPL-SCNC: 26 MMOL/L (ref 20–32)
CREAT SERPL-MCNC: 0.83 MG/DL (ref 0.52–1.04)
DIFFERENTIAL METHOD BLD: ABNORMAL
EOSINOPHIL # BLD AUTO: 0.1 10E9/L (ref 0–0.7)
EOSINOPHIL NFR BLD AUTO: 1.7 %
ERYTHROCYTE [DISTWIDTH] IN BLOOD BY AUTOMATED COUNT: 12.3 % (ref 10–15)
GFR SERPL CREATININE-BSD FRML MDRD: >90 ML/MIN/{1.73_M2}
GLUCOSE SERPL-MCNC: 84 MG/DL (ref 70–99)
HCT VFR BLD AUTO: 41 % (ref 35–47)
HGB BLD-MCNC: 13.9 G/DL (ref 11.7–15.7)
IMM GRANULOCYTES # BLD: 0 10E9/L (ref 0–0.4)
IMM GRANULOCYTES NFR BLD: 0.4 %
LYMPHOCYTES # BLD AUTO: 1.8 10E9/L (ref 0.8–5.3)
LYMPHOCYTES NFR BLD AUTO: 25.2 %
MCH RBC QN AUTO: 33.1 PG (ref 26.5–33)
MCHC RBC AUTO-ENTMCNC: 33.9 G/DL (ref 31.5–36.5)
MCV RBC AUTO: 98 FL (ref 78–100)
MONOCYTES # BLD AUTO: 0.7 10E9/L (ref 0–1.3)
MONOCYTES NFR BLD AUTO: 9.2 %
NEUTROPHILS # BLD AUTO: 4.5 10E9/L (ref 1.6–8.3)
NEUTROPHILS NFR BLD AUTO: 63.1 %
NRBC # BLD AUTO: 0 10*3/UL
NRBC BLD AUTO-RTO: 0 /100
PLATELET # BLD AUTO: 211 10E9/L (ref 150–450)
POTASSIUM SERPL-SCNC: 3.9 MMOL/L (ref 3.4–5.3)
RBC # BLD AUTO: 4.2 10E12/L (ref 3.8–5.2)
SODIUM SERPL-SCNC: 138 MMOL/L (ref 133–144)
TROPONIN I SERPL-MCNC: <0.015 UG/L (ref 0–0.04)
WBC # BLD AUTO: 7.2 10E9/L (ref 4–11)

## 2020-02-03 PROCEDURE — 71046 X-RAY EXAM CHEST 2 VIEWS: CPT

## 2020-02-03 PROCEDURE — 99285 EMERGENCY DEPT VISIT HI MDM: CPT | Mod: 25

## 2020-02-03 PROCEDURE — 80048 BASIC METABOLIC PNL TOTAL CA: CPT | Performed by: EMERGENCY MEDICINE

## 2020-02-03 PROCEDURE — 84484 ASSAY OF TROPONIN QUANT: CPT | Performed by: EMERGENCY MEDICINE

## 2020-02-03 PROCEDURE — 93005 ELECTROCARDIOGRAM TRACING: CPT

## 2020-02-03 PROCEDURE — 85025 COMPLETE CBC W/AUTO DIFF WBC: CPT | Performed by: EMERGENCY MEDICINE

## 2020-02-03 NOTE — ED TRIAGE NOTES
Patient reports mid-sternal chest pain for a couple of days. She has had similar pain in the past and has been worked up for it without diagnosis. Today left arm felt numb and tingly and maybe was there yesterday as well. Pain and arm n/t wax and wane but do not go away.

## 2020-02-03 NOTE — ED PROVIDER NOTES
History     Chief Complaint:  Chest Pain      HPI   Linda Ricketts is a 34 year old female who presents with chest pain. Patient states that she was seen last year for chest discomfort. She states that she has had similar intermittent chest pain symptoms going on since . She notes her last couple episodes of chest pain have been centered slightly to the left of her sternum. She notes that the episodes usually last a few days, and during the episodes her arm feels funny. She states that these episode happen more when she is stressed out. However, after she goes to the hospital, she feels fine.  She notes this current episode started a couple days ago. She states she feels a dull pain in her chest which waxes and wanes. The pain has fluctuated, but has stayed relatively the same intensity since it began. She also notes that stress makes the pain worse. She also states that currently it has been a stressful time in her life. She notes she had a hysterectomy in November. She notes casual alcohol use. She denies experiencing swelling in her legs, or back pain. She denies history of hypertension or recent travels. She denies family history of heart problems under 65.     Allergies:  Ceclor  Penicillins  Sulfa Drugs    Medications:    The patient is currently on no regular medications.    Past Medical History:    Hypertension    Past Surgical History:     section  Tonsillectomy  Bladder surgery    Family History:    Mother: Asthma  Sister: Thyroid disorder    Social History:  Denies tobacco use  Social alcohol use  Denies drug use    Marital Status:   [2]    Review of Systems   All other systems reviewed and are negative.      Physical Exam   First Vitals:  BP: (!) 155/101  Pulse: 95  Heart Rate: 95  Temp: 97.4  F (36.3  C)  Resp: 16  Weight: 115.7 kg (255 lb)  SpO2: 100 %      Physical Exam  General: Resting on the bed.  Head: No obvious trauma to head.  Ears, Nose, Throat:  External ears normal.  Nose  normal.    Eyes:  Conjunctivae clear.  Pupils are equal, round, and reactive.   Neck: Normal range of motion.  Neck supple.   CV: Regular rate and rhythm.  No murmurs.  2+ radial pulses.  Mild left chest wall pain to palpation     Respiratory: Effort normal and breath sounds normal.  No wheezing or crackles.   Gastrointestinal: Soft.  No distension. There is no tenderness.   Musculoskeletal: Non tender non edematous calves  Neuro: Alert. Moving all extremities appropriately.  Normal speech.    Skin: Skin is warm and dry.  No rash noted.     Emergency Department Course   ECG:  Indication: Chest Pain  Time: 1430  Vent. Rate 85 bpm. NM interval 140. QRS duration 78. QT/QTc 360/428. P-R-T axis 47 31 37.  Normal sinus rhythm with sinus arrhythmia Low voltage QRS - Old per chart review Septal infarct, age undetermined Abnormal ECG. Read time: 1432    Imaging:  XR Chest 2 views:   IMPRESSION: Negative chest. as per radiology.    Laboratory:  CBC: WBC: 7.2, HGB: 13.9, PLT: 211    BMP: Glucose 84,  o/w WNL (Creatinine: 0.83)    1724 Troponin: <0.015      Emergency Department Course:  Nursing notes and vitals reviewed. (6487) I performed an exam of the patient as documented above.     IV inserted. Blood drawn. This was sent to the lab for further testing, results above.     The patient was sent for a XR Chest 2 Views while in the emergency department, findings above.     7517 I rechecked the patient and discussed the results of her workup thus far.     Findings and plan explained to the Patient. Patient discharged home with instructions regarding supportive care, medications, and reasons to return. The importance of close follow-up was reviewed.     I personally reviewed the laboratory results with the Patient and answered all related questions prior to discharge.     Impression & Plan      Medical Decision Makin-year-old female presents with chest pain.  Vital signs are reassuring.  Broad differential was pursued  including not limited to acute coronary syndrome, arrhythmia, PE, dissection, pneumonia, pneumothorax, effusion, chest wall pain, pericarditis, etc.  Overall patient's well-appearing nontoxic.  Patient has been having this pain for greater than 6 hours.  Blood work was obtained.  Chest x-ray shows no acute pneumonia, pneumothorax, effusion.  Patient has no tearing pain, normal pulses, no evidence of dissection.  Normal-appearing mediastinum.  Considered PE but patient is PERC score negative.  CBC shows no leukocytosis or anemia.  BMP shows no acute electrolyte, metabolic, renal dysfunction.  EKG shows sinus rhythm, no evidence acute ST-T wave change.  No evidence of arrhythmia.  No evidence of ischemia.  Troponin is negative.  Pain has been greater than 6 hours therefore one troponin is sufficient.  Patient has heart score 1.  Overall low suspicion for acute coronary syndrome.  Patient does have some reproducible chest wall pain to palpation.  Considered pericarditis but no EKG changes or positional changes that would be suggestive of pericarditis.  Patient is otherwise well-appearing nontoxic.  She does appear to have some anxiety as her pain typically resolves after she has been assessed.  Seems like to be chest wall pain.  I discussed that with this ongoing recurrent pain I recommend that she follows up closely with her primary care doctor.  She may need more testing at her primary care doctor's office.  If pain recurs or worsen she should return to the ER.  She voiced understanding the plan and felt comfortable discharging home.    Diagnosis:    ICD-10-CM    1. Chest pain, unspecified type R07.9        Disposition:  discharged to home    Discharge Medications:  Discharge Medication List as of 2/3/2020  6:12 PM        Scribe Disclosure:  Derek LEVIN, am serving as a scribe on 2/3/2020 at 4:37 PM to personally document services performed by Pema Whyte MD based on my observations and the  provider's statements to me.     Derek Barraganu-Eneh  2/3/2020   [facility] EMERGENCY DEPARTMENT       Pema Whyte MD  02/04/20 0154

## 2020-02-03 NOTE — ED AVS SNAPSHOT
[facility] Emergency Department  [address]  [facility] [address]  Phone:  [phone]  Fax:  [phone]                                    Linda Ricketts   MRN: [id number]    Department:  [facility] Emergency Department   Date of Visit:  2/3/2020           After Visit Summary Signature Page    I have received my discharge instructions, and my questions have been answered. I have discussed any challenges I see with this plan with the nurse or doctor.    ..........................................................................................................................................  Patient/Patient Representative Signature      ..........................................................................................................................................  Patient Representative Print Name and Relationship to Patient    ..................................................               ................................................  Date                                   Time    ..........................................................................................................................................  Reviewed by Signature/Title    ...................................................              ..............................................  Date                                               Time          22EPIC Rev 08/18

## 2020-02-04 LAB — INTERPRETATION ECG - MUSE: NORMAL

## 2020-02-04 NOTE — DISCHARGE INSTRUCTIONS
Please return to the ED if you have active chest pain, shortness of breath, nausea, sweatiness, or other acute changes.  Please follow up with your PCP in the next 2-3 days.      Discharge Instructions  Chest Pain    You have been seen today for chest pain or discomfort.  At this time, your provider has found no signs that your chest pain is due to a serious or life-threatening condition, (or you have declined more testing and/or admission to the hospital). However, sometimes there is a serious problem that does not show up right away. Your evaluation today may not be complete and you may need further testing and evaluation.     Generally, every Emergency Department visit should have a follow-up clinic visit with either a primary or a specialty clinic/provider. Please follow-up as instructed by your emergency provider today.  Return to the Emergency Department if:  Your chest pain changes, gets worse, starts to happen more often, or comes with less activity.  You are newly short of breath.  You get very weak or tired.  You pass out or faint.  You have any new symptoms, like fever, cough, numb legs, or you cough up blood.  You have anything else that worries you.    Until you follow-up with your regular provider, please do the following:  Take one aspirin daily unless you have an allergy or are told not to by your provider.  If a stress test appointment has been made, go to the appointment.  If you have questions, contact your regular provider.  Follow-up with your regular provider/clinic as directed; this is very important.    If you were given a prescription for medicine here today, be sure to read all of the information (including the package insert) that comes with your prescription.  This will include important information about the medicine, its side effects, and any warnings that you need to know about.  The pharmacist who fills the prescription can provide more information and answer questions you may have  about the medicine.  If you have questions or concerns that the pharmacist cannot address, please call or return to the Emergency Department.       Remember that you can always come back to the Emergency Department if you are not able to see your regular provider in the amount of time listed above, if you get any new symptoms, or if there is anything that worries you.

## 2020-03-10 ENCOUNTER — HEALTH MAINTENANCE LETTER (OUTPATIENT)
Age: 35
End: 2020-03-10

## 2020-12-27 ENCOUNTER — HEALTH MAINTENANCE LETTER (OUTPATIENT)
Age: 35
End: 2020-12-27

## 2021-04-14 ENCOUNTER — OFFICE VISIT (OUTPATIENT)
Dept: NURSING | Facility: CLINIC | Age: 36
End: 2021-04-14
Payer: COMMERCIAL

## 2021-04-14 PROCEDURE — 91300 PR COVID VAC PFIZER DIL RECON 30 MCG/0.3 ML IM: CPT

## 2021-04-14 PROCEDURE — 0001A PR COVID VAC PFIZER DIL RECON 30 MCG/0.3 ML IM: CPT

## 2021-04-24 ENCOUNTER — HEALTH MAINTENANCE LETTER (OUTPATIENT)
Age: 36
End: 2021-04-24

## 2021-05-05 ENCOUNTER — IMMUNIZATION (OUTPATIENT)
Dept: NURSING | Facility: CLINIC | Age: 36
End: 2021-05-05
Attending: INTERNAL MEDICINE
Payer: COMMERCIAL

## 2021-05-05 PROCEDURE — 0002A PR COVID VAC PFIZER DIL RECON 30 MCG/0.3 ML IM: CPT

## 2021-05-05 PROCEDURE — 91300 PR COVID VAC PFIZER DIL RECON 30 MCG/0.3 ML IM: CPT

## 2021-10-09 ENCOUNTER — HEALTH MAINTENANCE LETTER (OUTPATIENT)
Age: 36
End: 2021-10-09

## 2022-05-21 ENCOUNTER — HEALTH MAINTENANCE LETTER (OUTPATIENT)
Age: 37
End: 2022-05-21

## 2022-09-11 ENCOUNTER — HEALTH MAINTENANCE LETTER (OUTPATIENT)
Age: 37
End: 2022-09-11

## 2023-06-03 ENCOUNTER — HEALTH MAINTENANCE LETTER (OUTPATIENT)
Age: 38
End: 2023-06-03

## 2023-08-15 NOTE — PLAN OF CARE
Pt tolerated regular diet well.  Assisted Pt up to bathroom again.  Pt walked to bathroom and walked in the rangel well.  Urvashi cares performed.  Pt is comfortably resting at this time.  Continues to monitor Pt.   1.47

## 2024-02-24 ENCOUNTER — HEALTH MAINTENANCE LETTER (OUTPATIENT)
Age: 39
End: 2024-02-24

## (undated) DEVICE — SOL NACL 0.9% IRRIG 1000ML BOTTLE 07138-09

## (undated) DEVICE — LINEN C-SECTION 5415

## (undated) DEVICE — CATH TRAY FOLEY 16FR BARDEX W/DRAIN BAG STATLOCK 300316A

## (undated) DEVICE — PACK C-SECTION LF PL15OTA83B

## (undated) DEVICE — BLADE CLIPPER 4406

## (undated) DEVICE — SOL WATER IRRIG 1000ML BOTTLE 07139-09

## (undated) DEVICE — GLOVE PROTEXIS W/NEU-THERA 6.5  2D73TE65

## (undated) DEVICE — PREP DURAPREP 26ML APL 8630

## (undated) DEVICE — SUCTION CANISTER MEDIVAC LINER 3000ML W/LID 65651-530

## (undated) DEVICE — DRSG TELFA ISLAND 4X8" 7541

## (undated) DEVICE — ESU GROUND PAD UNIVERSAL W/O CORD

## (undated) DEVICE — SU VICRYL 0 CT 36" J358H

## (undated) DEVICE — SU VICRYL 4-0 FS-1 27" J441H